# Patient Record
Sex: MALE | Race: WHITE | NOT HISPANIC OR LATINO | URBAN - METROPOLITAN AREA
[De-identification: names, ages, dates, MRNs, and addresses within clinical notes are randomized per-mention and may not be internally consistent; named-entity substitution may affect disease eponyms.]

---

## 2023-07-20 ENCOUNTER — TELEPHONE (OUTPATIENT)
Dept: ADMINISTRATIVE | Facility: OTHER | Age: 58
End: 2023-07-20

## 2023-07-20 ENCOUNTER — OFFICE VISIT (OUTPATIENT)
Dept: FAMILY MEDICINE CLINIC | Facility: CLINIC | Age: 58
End: 2023-07-20
Payer: COMMERCIAL

## 2023-07-20 VITALS
OXYGEN SATURATION: 98 % | WEIGHT: 222 LBS | HEART RATE: 66 BPM | TEMPERATURE: 96.5 F | BODY MASS INDEX: 33.65 KG/M2 | DIASTOLIC BLOOD PRESSURE: 88 MMHG | HEIGHT: 68 IN | RESPIRATION RATE: 16 BRPM | SYSTOLIC BLOOD PRESSURE: 130 MMHG

## 2023-07-20 DIAGNOSIS — Z12.5 SCREENING FOR PROSTATE CANCER: ICD-10-CM

## 2023-07-20 DIAGNOSIS — E78.5 HYPERLIPIDEMIA, UNSPECIFIED HYPERLIPIDEMIA TYPE: ICD-10-CM

## 2023-07-20 DIAGNOSIS — Z00.00 ENCOUNTER FOR WELL ADULT EXAM WITHOUT ABNORMAL FINDINGS: Primary | ICD-10-CM

## 2023-07-20 DIAGNOSIS — Z13.6 SCREENING FOR CARDIOVASCULAR CONDITION: ICD-10-CM

## 2023-07-20 PROBLEM — E78.49 OTHER HYPERLIPIDEMIA: Status: ACTIVE | Noted: 2022-05-23

## 2023-07-20 PROBLEM — E55.9 VITAMIN D DEFICIENCY: Status: ACTIVE | Noted: 2017-10-02

## 2023-07-20 PROBLEM — J30.89 NON-SEASONAL ALLERGIC RHINITIS: Status: ACTIVE | Noted: 2022-05-23

## 2023-07-20 PROBLEM — I10 HYPERTENSION: Status: ACTIVE | Noted: 2021-05-26

## 2023-07-20 PROBLEM — I35.1 MILD AORTIC REGURGITATION: Status: ACTIVE | Noted: 2019-04-15

## 2023-07-20 PROBLEM — R93.1 AGATSTON CAC SCORE 200-399: Status: ACTIVE | Noted: 2020-10-19

## 2023-07-20 PROBLEM — N20.0 KIDNEY STONE: Status: ACTIVE | Noted: 2022-05-23

## 2023-07-20 PROBLEM — I51.7 LEFT VENTRICULAR HYPERTROPHY: Status: ACTIVE | Noted: 2022-05-09

## 2023-07-20 PROBLEM — Z02.89 ENCOUNTER FOR EXAMINATION REQUIRED BY DEPARTMENT OF TRANSPORTATION (DOT): Status: ACTIVE | Noted: 2021-05-27

## 2023-07-20 PROCEDURE — 99386 PREV VISIT NEW AGE 40-64: CPT | Performed by: FAMILY MEDICINE

## 2023-07-20 RX ORDER — AMLODIPINE BESYLATE 5 MG/1
5 TABLET ORAL DAILY
COMMUNITY
Start: 2023-06-07 | End: 2023-07-24 | Stop reason: SDUPTHER

## 2023-07-20 RX ORDER — LORATADINE 10 MG/1
10 TABLET ORAL DAILY
COMMUNITY

## 2023-07-20 RX ORDER — RAMIPRIL 5 MG/1
5 CAPSULE ORAL DAILY
COMMUNITY
Start: 2023-06-25 | End: 2023-07-24 | Stop reason: SDUPTHER

## 2023-07-20 RX ORDER — ROSUVASTATIN CALCIUM 10 MG/1
10 TABLET, COATED ORAL
COMMUNITY
Start: 2023-04-27 | End: 2023-07-24 | Stop reason: SDUPTHER

## 2023-07-20 RX ORDER — OMEPRAZOLE 40 MG/1
40 CAPSULE, DELAYED RELEASE ORAL DAILY
COMMUNITY
Start: 2023-05-02 | End: 2023-07-24 | Stop reason: SDUPTHER

## 2023-07-20 NOTE — LETTER
Procedure Request Form: Colonoscopy      Date Requested: 23  Patient: Malia Crouch  Patient : 1965   Referring Provider: Yuan Mouse, DO        Date of Procedure ______________________________       The above patient has informed us that they have completed their   most recent Colonoscopy at your facility. Please complete   this form and attach all corresponding procedure reports/results. Comments __________________________________________________________  ____________________________________________________________________  ____________________________________________________________________  ____________________________________________________________________    Facility Completing Procedure _________________________________________    Form Completed By (print name) _______________________________________      Signature __________________________________________________________      These reports are needed for  compliance. Please fax this completed form and a copy of the procedure report to our office located at [address] [place] [address] as soon as possible to Fax [phone] attention Radha Baez: Phone [phone]    We thank you for your assistance in treating our mutual patient.     Dr. Mami Klein - [phone] F [phone]

## 2023-07-20 NOTE — LETTER
Procedure Request Form: Colonoscopy      Date Requested: 23  Patient: Cttyloaz Naval  Patient : 1965   Referring Provider: Marco A Magdipper, DO        Date of Procedure ______________________________       The above patient has informed us that they have completed their   most recent Colonoscopy at your facility. Please complete   this form and attach all corresponding procedure reports/results. Comments __________________________________________________________  ____________________________________________________________________  ____________________________________________________________________  ____________________________________________________________________    Facility Completing Procedure _________________________________________    Form Completed By (print name) _______________________________________      Signature __________________________________________________________      These reports are needed for  compliance. Please fax this completed form and a copy of the procedure report to our office located at [address] [place] [address] as soon as possible to Fax [phone] matilde Cho: Phone [phone]    We thank you for your assistance in treating our mutual patient.     Dr. Brittaney Hanley - [phone] F [phone]

## 2023-07-20 NOTE — TELEPHONE ENCOUNTER
----- Message from Suma Olmstead DO sent at 7/20/2023  8:52 AM EDT -----  Regarding: care gap request  07/20/23 8:52 AM    Hello, our patient attached above has had CRC: Colonoscopy completed/performed. Please assist in updating the patient chart by making an External outreach to [facility]  facility located in [facility] - dr Sarah Mitchell . The date of service is 8 years ago -10 year recall.     Thank you,  Amaury [facility]ALES [facility] Madhav WINTER

## 2023-07-20 NOTE — PROGRESS NOTES
Assessment/Plan:     Diagnoses and all orders for this visit:    Encounter for well adult exam without abnormal findings    Screening for cardiovascular condition  -     CBC and differential; Future  -     Comprehensive metabolic panel; Future  -     Lipid panel; Future    Screening for prostate cancer  -     UA (URINE) with reflex to Scope; Future  -     PSA, Total Screen; Future    Hyperlipidemia, unspecified hyperlipidemia type  -     Lipid panel; Future    Other orders  -     amLODIPine (NORVASC) 5 mg tablet; Take 5 mg by mouth daily  -     loratadine (CLARITIN) 10 mg tablet; Take 10 mg by mouth daily  -     omeprazole (PriLOSEC) 40 MG capsule; Take 40 mg by mouth daily  -     ramipril (ALTACE) 5 mg capsule; Take 5 mg by mouth daily  -     rosuvastatin (CRESTOR) 10 MG tablet; Take 10 mg by mouth daily at bedtime      Patient presented to establish  Meds are stable  Labs ordered  We will try to get his colonoscopy reports  His blood pressure is under control  He has no acute complaints today  Can follow-up as needed or in 1 year      Subjective:     Chief Complaint   Patient presents with   • Establish Care     New patient Physical        Patient ID: Carmen Carrillo is a 59 y.o. male. Patient presents today to establish  He has no acute complaints today we did review his past medical history in detail      The following portions of the patient's history were reviewed and updated as appropriate: allergies, current medications, past family history, past medical history, past social history, past surgical history and problem list.    Review of Systems   Constitutional: Negative. HENT: Negative. Eyes: Negative. Respiratory: Negative. Cardiovascular: Negative. Gastrointestinal: Negative. Endocrine: Negative. Genitourinary: Negative. Musculoskeletal: Negative. Skin: Negative. Allergic/Immunologic: Negative. Neurological: Negative. Hematological: Negative. Psychiatric/Behavioral: Negative. All other systems reviewed and are negative. Objective:    Vitals:    07/20/23 0835   BP: 130/88   BP Location: Left arm   Patient Position: Sitting   Cuff Size: Large   Pulse: 66   Resp: 16   Temp: (!) 96.5 °F (35.8 °C)   TempSrc: Tympanic   SpO2: 98%   Weight: 101 kg (222 lb)   Height: 5' 8.2" (1.732 m)          Physical Exam  Vitals and nursing note reviewed. Constitutional:       General: He is not in acute distress. Appearance: He is well-developed. HENT:      Head: Normocephalic. Right Ear: External ear normal.      Left Ear: External ear normal.      Nose: Nose normal.   Eyes:      General:         Right eye: No discharge. Left eye: No discharge. Conjunctiva/sclera: Conjunctivae normal.      Pupils: Pupils are equal, round, and reactive to light. Cardiovascular:      Rate and Rhythm: Normal rate and regular rhythm. Heart sounds: Normal heart sounds. Pulmonary:      Effort: Pulmonary effort is normal.      Breath sounds: Normal breath sounds. Abdominal:      General: Bowel sounds are normal. There is no distension. Palpations: Abdomen is soft. Tenderness: There is no abdominal tenderness. Musculoskeletal:         General: Normal range of motion. Cervical back: Normal range of motion. Skin:     General: Skin is warm and dry. Findings: No rash. Neurological:      Mental Status: He is alert and oriented to person, place, and time. Cranial Nerves: No cranial nerve deficit. Psychiatric:         Behavior: Behavior normal.         Thought Content: Thought content normal.         Judgment: Judgment normal.         BMI Counseling: Body mass index is 33.56 kg/m².  The BMI is above normal. Nutrition recommendations include reducing portion sizes, decreasing overall calorie intake, 3-5 servings of fruits/vegetables daily, reducing fast food intake, consuming healthier snacks, decreasing soda and/or juice intake, moderation in carbohydrate intake, increasing intake of lean protein, reducing intake of saturated fat and trans fat and reducing intake of cholesterol. Exercise recommendations include exercising 3-5 times per week.

## 2023-07-20 NOTE — LETTER
Procedure Request Form: Colonoscopy      Date Requested: 23  Patient: Marshia Bridgeport  Patient : 1965   Referring Provider: Silviano Ortega, DO        Date of Procedure ______________________________       The above patient has informed us that they have completed their   most recent Colonoscopy at your facility. Please complete   this form and attach all corresponding procedure reports/results. Comments __________________________________________________________  ____________________________________________________________________  ____________________________________________________________________  ____________________________________________________________________    Facility Completing Procedure _________________________________________    Form Completed By (print name) _______________________________________      Signature __________________________________________________________      These reports are needed for  compliance. Please fax this completed form and a copy of the procedure report to our office located at [address] [place] [address] as soon as possible to Fax [phone] matilde Trevino Bar: Phone [phone]    We thank you for your assistance in treating our mutual patient.     Dr. Shanel Verdugo - [phone] F [phone]

## 2023-07-24 DIAGNOSIS — E78.49 OTHER HYPERLIPIDEMIA: ICD-10-CM

## 2023-07-24 DIAGNOSIS — K21.9 GASTROESOPHAGEAL REFLUX DISEASE WITHOUT ESOPHAGITIS: ICD-10-CM

## 2023-07-24 DIAGNOSIS — I10 PRIMARY HYPERTENSION: Primary | ICD-10-CM

## 2023-07-24 RX ORDER — RAMIPRIL 5 MG/1
5 CAPSULE ORAL DAILY
Qty: 90 CAPSULE | Refills: 1 | Status: SHIPPED | OUTPATIENT
Start: 2023-07-24

## 2023-07-24 RX ORDER — OMEPRAZOLE 40 MG/1
40 CAPSULE, DELAYED RELEASE ORAL DAILY
Qty: 90 CAPSULE | Refills: 1 | Status: SHIPPED | OUTPATIENT
Start: 2023-07-24

## 2023-07-24 RX ORDER — ROSUVASTATIN CALCIUM 10 MG/1
10 TABLET, COATED ORAL
Qty: 90 TABLET | Refills: 1 | Status: SHIPPED | OUTPATIENT
Start: 2023-07-24

## 2023-07-24 RX ORDER — AMLODIPINE BESYLATE 5 MG/1
5 TABLET ORAL DAILY
Qty: 90 TABLET | Refills: 1 | Status: SHIPPED | OUTPATIENT
Start: 2023-07-24

## 2023-07-26 NOTE — TELEPHONE ENCOUNTER
Upon review of the In Basket request and the patient's chart, initial outreach has been made via fax to facility. Please see Contacts section for details.      Thank you  Js Lujan

## 2023-07-31 NOTE — TELEPHONE ENCOUNTER
As a follow-up, a second attempt has been made for outreach via fax to facility. Please see Contacts section for details.     Thank you  Parul Lyles

## 2023-08-08 NOTE — TELEPHONE ENCOUNTER
As a final attempt, a third outreach has been made via telephone call to facility. The outcome of the telephone call was a fax request form to be sent to Office/Facility. Please see Contacts section for details. This encounter will be closed and completed by end of day. Should we receive the requested information because of previous outreach attempts, the requested patient's chart will be updated appropriately.      Thank you  Bobby Bates

## 2023-08-29 LAB
ALBUMIN SERPL-MCNC: 4.6 G/DL (ref 3.8–4.9)
ALBUMIN/GLOB SERPL: 2.3 {RATIO} (ref 1.2–2.2)
ALP SERPL-CCNC: 104 IU/L (ref 44–121)
ALT SERPL-CCNC: 20 IU/L (ref 0–44)
APPEARANCE UR: CLEAR
AST SERPL-CCNC: 19 IU/L (ref 0–40)
BACTERIA URNS QL MICRO: NORMAL
BASOPHILS # BLD AUTO: 0 X10E3/UL (ref 0–0.2)
BASOPHILS NFR BLD AUTO: 1 %
BILIRUB SERPL-MCNC: 0.8 MG/DL (ref 0–1.2)
BILIRUB UR QL STRIP: NEGATIVE
BUN SERPL-MCNC: 16 MG/DL (ref 8–27)
BUN/CREAT SERPL: 18 (ref 10–24)
CALCIUM SERPL-MCNC: 9 MG/DL (ref 8.6–10.2)
CASTS URNS QL MICRO: NORMAL /LPF
CHLORIDE SERPL-SCNC: 105 MMOL/L (ref 96–106)
CHOLEST SERPL-MCNC: 138 MG/DL (ref 100–199)
CO2 SERPL-SCNC: 26 MMOL/L (ref 20–29)
COLOR UR: YELLOW
CREAT SERPL-MCNC: 0.89 MG/DL (ref 0.76–1.27)
EGFR: 98 ML/MIN/1.73
EOSINOPHIL # BLD AUTO: 0.2 X10E3/UL (ref 0–0.4)
EOSINOPHIL NFR BLD AUTO: 4 %
EPI CELLS #/AREA URNS HPF: NORMAL /HPF (ref 0–10)
ERYTHROCYTE [DISTWIDTH] IN BLOOD BY AUTOMATED COUNT: 11.8 % (ref 11.6–15.4)
GLOBULIN SER-MCNC: 2 G/DL (ref 1.5–4.5)
GLUCOSE SERPL-MCNC: 95 MG/DL (ref 70–99)
GLUCOSE UR QL: NEGATIVE
HCT VFR BLD AUTO: 44.7 % (ref 37.5–51)
HDLC SERPL-MCNC: 62 MG/DL
HGB BLD-MCNC: 15.4 G/DL (ref 13–17.7)
HGB UR QL STRIP: NEGATIVE
IMM GRANULOCYTES # BLD: 0 X10E3/UL (ref 0–0.1)
IMM GRANULOCYTES NFR BLD: 0 %
KETONES UR QL STRIP: NEGATIVE
LDLC SERPL CALC-MCNC: 64 MG/DL (ref 0–99)
LEUKOCYTE ESTERASE UR QL STRIP: ABNORMAL
LYMPHOCYTES # BLD AUTO: 2 X10E3/UL (ref 0.7–3.1)
LYMPHOCYTES NFR BLD AUTO: 30 %
MCH RBC QN AUTO: 31.4 PG (ref 26.6–33)
MCHC RBC AUTO-ENTMCNC: 34.5 G/DL (ref 31.5–35.7)
MCV RBC AUTO: 91 FL (ref 79–97)
MICRO URNS: ABNORMAL
MONOCYTES # BLD AUTO: 0.9 X10E3/UL (ref 0.1–0.9)
MONOCYTES NFR BLD AUTO: 13 %
NEUTROPHILS # BLD AUTO: 3.7 X10E3/UL (ref 1.4–7)
NEUTROPHILS NFR BLD AUTO: 52 %
NITRITE UR QL STRIP: NEGATIVE
PH UR STRIP: 7 [PH] (ref 5–7.5)
PLATELET # BLD AUTO: 209 X10E3/UL (ref 150–450)
POTASSIUM SERPL-SCNC: 4.3 MMOL/L (ref 3.5–5.2)
PROT SERPL-MCNC: 6.6 G/DL (ref 6–8.5)
PROT UR QL STRIP: ABNORMAL
PSA SERPL-MCNC: 0.4 NG/ML (ref 0–4)
RBC # BLD AUTO: 4.91 X10E6/UL (ref 4.14–5.8)
RBC #/AREA URNS HPF: NORMAL /HPF (ref 0–2)
SL AMB VLDL CHOLESTEROL CALC: 12 MG/DL (ref 5–40)
SODIUM SERPL-SCNC: 143 MMOL/L (ref 134–144)
SP GR UR: 1.02 (ref 1–1.03)
TRIGL SERPL-MCNC: 53 MG/DL (ref 0–149)
UROBILINOGEN UR STRIP-ACNC: 0.2 MG/DL (ref 0.2–1)
WBC # BLD AUTO: 6.9 X10E3/UL (ref 3.4–10.8)
WBC #/AREA URNS HPF: NORMAL /HPF (ref 0–5)

## 2023-10-06 ENCOUNTER — APPOINTMENT (OUTPATIENT)
Dept: RADIOLOGY | Facility: CLINIC | Age: 58
End: 2023-10-06
Payer: COMMERCIAL

## 2023-10-06 ENCOUNTER — OFFICE VISIT (OUTPATIENT)
Dept: URGENT CARE | Facility: CLINIC | Age: 58
End: 2023-10-06
Payer: COMMERCIAL

## 2023-10-06 VITALS
BODY MASS INDEX: 33.65 KG/M2 | TEMPERATURE: 98.8 F | HEART RATE: 65 BPM | OXYGEN SATURATION: 98 % | SYSTOLIC BLOOD PRESSURE: 120 MMHG | WEIGHT: 222 LBS | DIASTOLIC BLOOD PRESSURE: 88 MMHG | RESPIRATION RATE: 16 BRPM | HEIGHT: 68 IN

## 2023-10-06 DIAGNOSIS — M54.6 ACUTE MIDLINE THORACIC BACK PAIN: Primary | ICD-10-CM

## 2023-10-06 DIAGNOSIS — S39.92XA INJURY OF BACK, INITIAL ENCOUNTER: ICD-10-CM

## 2023-10-06 PROCEDURE — 72072 X-RAY EXAM THORAC SPINE 3VWS: CPT

## 2023-10-06 PROCEDURE — G0382 LEV 3 HOSP TYPE B ED VISIT: HCPCS | Performed by: NURSE PRACTITIONER

## 2023-10-06 NOTE — PROGRESS NOTES
[facility]        NAME: Araceli Hirsch is a 59 y.o. male  : 1965    MRN: [id number]  DATE: 2023  TIME: 11:18 AM    Assessment and Plan   Acute midline thoracic back pain [M54.6]  1. Acute midline thoracic back pain  Ambulatory Referral to Physical Therapy    Ambulatory Referral to Orthopedic Surgery      2. Injury of back, initial encounter  XR spine thoracic 3 vw    Ambulatory Referral to Physical Therapy    Ambulatory Referral to Orthopedic Surgery        Symptomatic x3 to 4 months with constant pain worsening intermittently. States currently seeing chiropractor without relief. Wet read x-rays showed T10 chronic minimal loss of stature and all else unremarkable with no acute osseous abnormalities-radiologist read confirms. Patient not wanting to take medications but is willing for physical therapy and orthopedic referral.  Referrals placed follow-up with any worsening of symptoms no improvement    Patient Instructions       Follow up with PCP in 3-5 days. Proceed to  ER if symptoms worsen. Chief Complaint     Chief Complaint   Patient presents with   • Back Injury     Pt reports midline thoracic/lumbar back pain that resulted from injury that occurred four months while loading concrete. States symptoms have since been intermittent. Denies managing with otc medication. Has been f/u with chiropractor. History of Present Illness       Patient is a 13-year-old male arrives with complaints of thoracic back pain occurring for approximately 3 to 4 months. Patient reports the pain is midline to the thoracic back sometimes it radiates laterally to the bilateral sides. He denies any numbness tingling changes to bowel bladder habits and saddle anesthesia. He has not completed physical therapy has not taken any medications. He does report that the initial pain occurred approximately 3 to 4 months ago when he was lifting cement bags into his vehicle.   Since then he has been having constant 2-3 out of 10 on the pain scale and then worsening intermittently. He reports occasionally he feels a popping. He has been seeing the chiropractor who has been adjusting his back but has not had relief in this timeframe. Review of Systems   Review of Systems   Constitutional:  Negative for activity change, appetite change, chills, fatigue and fever. HENT:  Negative for congestion, rhinorrhea, sinus pressure, sinus pain and sore throat. Respiratory:  Negative for cough, chest tightness and shortness of breath. Gastrointestinal:  Negative for constipation, diarrhea, nausea and vomiting. Musculoskeletal:  Positive for arthralgias, back pain and myalgias. Negative for gait problem and joint swelling. Skin:  Negative for color change, pallor and rash. Neurological:  Negative for dizziness, syncope, weakness, light-headedness and headaches. Hematological:  Negative for adenopathy. Psychiatric/Behavioral:  Negative for agitation and confusion.           Current Medications       Current Outpatient Medications:   •  amLODIPine (NORVASC) 5 mg tablet, Take 1 tablet (5 mg total) by mouth daily, Disp: 90 tablet, Rfl: 1  •  omeprazole (PriLOSEC) 40 MG capsule, Take 1 capsule (40 mg total) by mouth daily, Disp: 90 capsule, Rfl: 1  •  ramipril (ALTACE) 5 mg capsule, Take 1 capsule (5 mg total) by mouth daily, Disp: 90 capsule, Rfl: 1  •  rosuvastatin (CRESTOR) 10 MG tablet, Take 1 tablet (10 mg total) by mouth daily at bedtime, Disp: 90 tablet, Rfl: 1  •  loratadine (CLARITIN) 10 mg tablet, Take 10 mg by mouth daily (Patient not taking: Reported on 10/6/2023), Disp: , Rfl:     Current Allergies     Allergies as of 10/06/2023   • (No Known Allergies)            The following portions of the patient's history were reviewed and updated as appropriate: allergies, current medications, past family history, past medical history, past social history, past surgical history and problem list. History reviewed. No pertinent past medical history. History reviewed. No pertinent surgical history. History reviewed. No pertinent family history. Medications have been verified. Objective   /88 (BP Location: Left arm, Patient Position: Sitting)   Pulse 65   Temp 98.8 °F (37.1 °C)   Resp 16   Ht 5' 8" (1.727 m)   Wt 101 kg (222 lb)   SpO2 98%   BMI 33.75 kg/m²   No LMP for male patient. Physical Exam     Physical Exam  Vitals and nursing note reviewed. Constitutional:       General: He is not in acute distress. Appearance: Normal appearance. He is not ill-appearing, toxic-appearing or diaphoretic. HENT:      Head: Normocephalic and atraumatic. Nose: No congestion or rhinorrhea. Mouth/Throat:      Mouth: Mucous membranes are moist.   Eyes:      General: No scleral icterus. Right eye: No discharge. Left eye: No discharge. Conjunctiva/sclera: Conjunctivae normal.   Pulmonary:      Effort: Pulmonary effort is normal. No respiratory distress. Musculoskeletal:         General: Tenderness and signs of injury present. No swelling. Cervical back: Normal range of motion. Thoracic back: Spasms and tenderness present. No swelling or signs of trauma. Decreased range of motion. Skin:     General: Skin is dry. Neurological:      Mental Status: He is alert and oriented to person, place, and time. Psychiatric:         Mood and Affect: Mood normal.         Behavior: Behavior normal.         Thought Content:  Thought content normal.         Judgment: Judgment normal.

## 2023-10-17 ENCOUNTER — CONSULT (OUTPATIENT)
Dept: PAIN MEDICINE | Facility: CLINIC | Age: 58
End: 2023-10-17
Payer: COMMERCIAL

## 2023-10-17 VITALS
BODY MASS INDEX: 32.89 KG/M2 | SYSTOLIC BLOOD PRESSURE: 132 MMHG | HEIGHT: 68 IN | TEMPERATURE: 98.5 F | DIASTOLIC BLOOD PRESSURE: 84 MMHG | HEART RATE: 74 BPM | WEIGHT: 217 LBS

## 2023-10-17 DIAGNOSIS — S22.000D COMPRESSION FRACTURE OF THORACIC VERTEBRA WITH ROUTINE HEALING, UNSPECIFIED THORACIC VERTEBRAL LEVEL, SUBSEQUENT ENCOUNTER: Primary | ICD-10-CM

## 2023-10-17 DIAGNOSIS — M54.6 ACUTE MIDLINE THORACIC BACK PAIN: ICD-10-CM

## 2023-10-17 DIAGNOSIS — S39.92XA INJURY OF BACK, INITIAL ENCOUNTER: ICD-10-CM

## 2023-10-17 PROCEDURE — 99204 OFFICE O/P NEW MOD 45 MIN: CPT | Performed by: ANESTHESIOLOGY

## 2023-10-17 RX ORDER — PREDNISONE 10 MG/1
TABLET ORAL
Qty: 36 TABLET | Refills: 0 | Status: SHIPPED | OUTPATIENT
Start: 2023-10-17

## 2023-10-17 NOTE — PROGRESS NOTES
Assessment  1. Compression fracture of thoracic vertebra with routine healing, unspecified thoracic vertebral level, subsequent encounter    2. Injury of back, initial encounter    3. Acute midline thoracic back pain        Plan      At this point the patient's pain persists despite time, relative rest, activity modification, and nonsteroidal anti-inflammatories. Is undergone chiropractic treatment and reviewing the imaging of the x-ray it does reveal potential of compression fracture at T10. His pain is significantly interfering with his daily living activities. It is appropriate to order an MRI of the thoracic spine to rule out any significant etiology of his symptoms. I will start him on a titrating dose of oral prednisone to address any inflammatory component of the patient's pain. He understands he should not take nonsteroidal anti-inflammatories until he is finished with this steroid. He understands there is a chance of decreased immunity secondary to steroids. If he has any problems or questions he will give us a call. Once we obtain MRI results, I will follow-up with the patient, review the results and current symptoms, and discuss the next steps of the treatment plan. My impressions and treatment recommendations were discussed in detail with the patient who verbalized understanding and had no further questions. Discharge instructions were provided. I personally saw and examined the patient and I agree with the above discussed plan of care. This note is created using dictation transcription. It may contain typographical errors, grammatical errors, improperly dictated words, background noise and other errors. Orders Placed This Encounter   Procedures    MRI thoracic spine without contrast     Standing Status:   Future     Standing Expiration Date:   10/17/2027     Scheduling Instructions:       There is no preparation for this test. Please leave your jewelry and valuables at home, wedding rings are the exception. All patients will be required to change into a hospital gown and pants. Street clothes are not permitted in the MRI. Magnetic nail polish must be removed prior to arrival for your test. Please bring your insurance cards, a form of photo ID and a list of your medications with you. Arrive 15 minutes prior to your appointment time in order to register. Please bring any prior CT or MRI studies of this area that were not performed at a [facility] facility. To schedule this appointment, please contact Central Scheduling at [phone]. Prior to your appointment, please make sure you complete the MRI Screening Form when you e-Check in for your appointment. This will be available starting 7 days before your appointment in [address] [place] [address]. You may receive an e-mail with an activation code if you do not have a [employer] account. If you do not have access to a device, we will complete your screening at your appointment. Order Specific Question:   What is the patient's sedation requirement? If Medication for Claustrophobia is selected, order medication at this point. Answer:   No Sedation     Order Specific Question:   Does this procedure require the 3T MRI at [facility] or [place]?     Answer:   No     Order Specific Question:   Release to patient through [facility]     Answer:   Immediate     Order Specific Question:   Is order priority selected as STAT? Answer:   No     Order Specific Question:   Reason for Exam (FREE TEXT)     Answer:   compresion fracture     New Medications Ordered This Visit   Medications    predniSONE 10 mg tablet     Sig: Take according to titration schedule     Dispense:  36 tablet     Refill:  0     Referred By: GLENNA Gutierrez  History of Present Illness    Elyssa Tavarez is a 59 y.o. male 1 month history of mid back pain which is radiating. This occurred after lifting heavy cement into his flatbed.   Since then he has undergone chiropractic treatment starting physical therapy but his pain persists which is moderate to severe. Is constant described as sharp and achy. He denies any weakness or bowel bladder dysfunction. Lying down and sitting decreases symptoms while bending and walking aggravates his pain. I have personally reviewed and/or updated the patient's past medical history, past surgical history, family history, social history, current medications, allergies, and vital signs today. Review of Systems   Constitutional:  Negative for fever and unexpected weight change. HENT:  Negative for trouble swallowing. Eyes:  Negative for visual disturbance. Respiratory:  Negative for shortness of breath and wheezing. Cardiovascular:  Negative for chest pain and palpitations. Gastrointestinal:  Negative for constipation, diarrhea, nausea and vomiting. Endocrine: Negative for cold intolerance, heat intolerance and polydipsia. Genitourinary:  Negative for difficulty urinating and frequency. Musculoskeletal:  Negative for arthralgias, gait problem, joint swelling and myalgias. Skin:  Negative for rash. Neurological:  Negative for dizziness, seizures, syncope, weakness and headaches. Hematological:  Does not bruise/bleed easily. Psychiatric/Behavioral:  Negative for dysphoric mood. All other systems reviewed and are negative.       Patient Active Problem List   Diagnosis    Abdominal pain    Agatston CAC score 200-399    Benign essential hypertension    Benign prostatic hyperplasia with urinary obstruction    Celiac disease    Depressive disorder, not elsewhere classified    Encounter for examination required by Department of Transportation (DOT)    Hydronephrosis    Hypertension    Impotence of organic origin    Kidney stone    Left inguinal hernia    Left ventricular hypertrophy    Microscopic hematuria    Mild aortic regurgitation    Narcolepsy    Non-seasonal allergic rhinitis    Other hyperlipidemia    Sleep apnea Vitamin D deficiency       Past Medical History:   Diagnosis Date    Hypertension        Past Surgical History:   Procedure Laterality Date    HERNIA REPAIR      TONSILLECTOMY         History reviewed. No pertinent family history. Social History     Occupational History    Not on file   Tobacco Use    Smoking status: Never     Passive exposure: Past    Smokeless tobacco: Never   Vaping Use    Vaping Use: Never used   Substance and Sexual Activity    Alcohol use: Yes     Alcohol/week: 7.0 - 8.0 standard drinks of alcohol     Types: 7 - 8 Standard drinks or equivalent per week    Drug use: Not Currently    Sexual activity: Not on file       Current Outpatient Medications on File Prior to Visit   Medication Sig    amLODIPine (NORVASC) 5 mg tablet Take 1 tablet (5 mg total) by mouth daily    omeprazole (PriLOSEC) 40 MG capsule Take 1 capsule (40 mg total) by mouth daily    ramipril (ALTACE) 5 mg capsule Take 1 capsule (5 mg total) by mouth daily    rosuvastatin (CRESTOR) 10 MG tablet Take 1 tablet (10 mg total) by mouth daily at bedtime    loratadine (CLARITIN) 10 mg tablet Take 10 mg by mouth daily (Patient not taking: Reported on 10/6/2023)     No current facility-administered medications on file prior to visit. No Known Allergies    Physical Exam    /84 (BP Location: Left arm, Patient Position: Sitting, Cuff Size: Standard)   Pulse 74   Temp 98.5 °F (36.9 °C)   Ht 5' 8" (1.727 m)   Wt 98.4 kg (217 lb)   BMI 32.99 kg/m²     Constitutional: normal, well developed, well nourished, alert, in no distress and non-toxic and no overt pain behavior.  and overweight  Eyes: anicteric  HEENT: grossly intact  Neck: supple, symmetric, trachea midline and no masses   Pulmonary:even and unlabored  Cardiovascular:No edema or pitting edema present  Skin:Normal without rashes or lesions and well hydrated  Psychiatric:Mood and affect appropriate  Neurologic:Cranial Nerves II-XII grossly intact  Musculoskeletal:normal    Thoracic Spine Exam    Appearance:  Normal lordosis  Palpation/Tenderness:  no tenderness or spasm  Sensory:  no sensory deficits noted  Range of Motion:  Full range of motion with no pain or limitations in flexion, extension, lateral flexion and rotation  Motor Strength:  No motor deficits are appreciated  Reflexes:  Symmetrical upper and lower limbs       Imaging  THORACIC SPINE @  10-6-23     INDICATION:   S39. 92XA: Unspecified injury of lower back, initial encounter. COMPARISON:  None     VIEWS:  XR SPINE THORACIC 3 VW        FINDINGS:     There is no fracture or pathologic bone lesion. T10 chronic minimal loss of stature. Thoracic vertebral alignment is within normal limits. No significant degenerative changes. There is no displacement of the paraspinal line. The pedicles appear intact. IMPRESSION:     No acute osseous abnormality. I have personally reviewed pertinent films in PACS and my interpretation is potential mild compression fracture at T10 with no significant degenerative changes.

## 2023-11-01 ENCOUNTER — HOSPITAL ENCOUNTER (OUTPATIENT)
Dept: MRI IMAGING | Facility: HOSPITAL | Age: 58
Discharge: HOME | End: 2023-11-01
Attending: ANESTHESIOLOGY
Payer: COMMERCIAL

## 2023-11-01 ENCOUNTER — HOSPITAL ENCOUNTER (OUTPATIENT)
Dept: RADIOLOGY | Facility: HOSPITAL | Age: 58
Discharge: HOME | End: 2023-11-01
Payer: COMMERCIAL

## 2023-11-01 DIAGNOSIS — M54.6 ACUTE MIDLINE THORACIC BACK PAIN: ICD-10-CM

## 2023-11-01 DIAGNOSIS — S22.000D COMPRESSION FRACTURE OF THORACIC VERTEBRA WITH ROUTINE HEALING, UNSPECIFIED THORACIC VERTEBRAL LEVEL, SUBSEQUENT ENCOUNTER: ICD-10-CM

## 2023-11-01 PROCEDURE — G1004 CDSM NDSC: HCPCS

## 2023-11-01 PROCEDURE — 72146 MRI CHEST SPINE W/O DYE: CPT

## 2023-11-05 DIAGNOSIS — S22.070A CLOSED WEDGE COMPRESSION FRACTURE OF T10 VERTEBRA, INITIAL ENCOUNTER (HCC): Primary | ICD-10-CM

## 2023-11-06 ENCOUNTER — TELEPHONE (OUTPATIENT)
Dept: PAIN MEDICINE | Facility: CLINIC | Age: 58
End: 2023-11-06

## 2023-11-06 NOTE — TELEPHONE ENCOUNTER
----- Message from Selina Romeo DO sent at 11/5/2023 10:04 AM EST -----  Age indeterminate compression fx of thoracic vertebrae.  Keep f/u with our office, recommend eval by Dr Jodee Rodriguez veterbroplasty if still in pain, referral placed

## 2023-11-09 ENCOUNTER — TELEPHONE (OUTPATIENT)
Dept: NEUROSURGERY | Facility: CLINIC | Age: 58
End: 2023-11-09

## 2023-12-28 PROBLEM — S22.079A CLOSED T10 FRACTURE (HCC): Status: ACTIVE | Noted: 2023-12-28

## 2023-12-28 NOTE — ASSESSMENT & PLAN NOTE
New evaluation of a T10 fracture  Back pain since May-June 2023 while twisting lifting bags of concrete.   Pain has improved, but is lingering. Denies radiation to his chest, abdomen.  Exam: No midline spinal TTP, BLE 5/5, LT intact, 2+ DTRs    Imaging:  MRI thoracic 11/1/23: Age indeterminant Schmorl's node deformity superior endplate of T10 associated with T1 and T2 signal prolongation so could be acute or subacute in nature. Old mild anterior wedging compression fracture of T10 loss of 15% anterior vertebral height. No other disc herniation or significant narrowing of the spinal canal or neural foramina evident. No neural encroachment evident.  XR thoracic 10/6/23:  No acute osseous abnormality.     Plan:  Reviewed imaging with patient and Dr. Murphy.  The T10 fracture is chronic on MRI imaging with an age-indeterminate Schmorl's node.  No recommendation for kyphoplasty at this time in the absence of an acute fracture.  Recommend DEXA scan to evaluate for osteoporosis given fracture.  Order placed.  He is aware to follow-up with his PCP for management.  Recommend following up with Dr. Martinez for consideration of possible injection.  As his pain continues to improve, he may start to resume his normal activities.  Reviewed red flag signs and symptoms.  Follow-up as needed.  Call with any questions or concerns.

## 2023-12-29 ENCOUNTER — OFFICE VISIT (OUTPATIENT)
Dept: NEUROSURGERY | Facility: CLINIC | Age: 58
End: 2023-12-29
Payer: COMMERCIAL

## 2023-12-29 VITALS
TEMPERATURE: 98.5 F | SYSTOLIC BLOOD PRESSURE: 138 MMHG | RESPIRATION RATE: 16 BRPM | OXYGEN SATURATION: 99 % | HEART RATE: 51 BPM | HEIGHT: 68 IN | WEIGHT: 213 LBS | DIASTOLIC BLOOD PRESSURE: 90 MMHG | BODY MASS INDEX: 32.28 KG/M2

## 2023-12-29 DIAGNOSIS — S22.070A CLOSED WEDGE COMPRESSION FRACTURE OF T10 VERTEBRA, INITIAL ENCOUNTER (HCC): ICD-10-CM

## 2023-12-29 PROCEDURE — 99203 OFFICE O/P NEW LOW 30 MIN: CPT | Performed by: PHYSICIAN ASSISTANT

## 2023-12-29 NOTE — PROGRESS NOTES
Neurosurgery Office Note  Jake Hines 58 y.o. male MRN: [id number]      Assessment/Plan     Closed T10 fracture (HCC)  New evaluation of a T10 fracture  Back pain since May-June 2023 while twisting lifting bags of concrete.   Pain has improved, but is lingering. Denies radiation to his chest, abdomen.  Exam: No midline spinal TTP, BLE 5/5, LT intact, 2+ DTRs    Imaging:  MRI thoracic 11/1/23: Age indeterminant Schmorl's node deformity superior endplate of T10 associated with T1 and T2 signal prolongation so could be acute or subacute in nature. Old mild anterior wedging compression fracture of T10 loss of 15% anterior vertebral height. No other disc herniation or significant narrowing of the spinal canal or neural foramina evident. No neural encroachment evident.  XR thoracic 10/6/23:  No acute osseous abnormality.     Plan:  Reviewed imaging with patient and Dr. Murphy.  The T10 fracture is chronic on MRI imaging with an age-indeterminate Schmorl's node.  No recommendation for kyphoplasty at this time in the absence of an acute fracture.  Recommend DEXA scan to evaluate for osteoporosis given fracture.  Order placed.  He is aware to follow-up with his PCP for management.  Recommend following up with Dr. Martinez for consideration of possible injection.  As his pain continues to improve, he may start to resume his normal activities.  Reviewed red flag signs and symptoms.  Follow-up as needed.  Call with any questions or concerns.       Diagnoses and all orders for this visit:    Closed wedge compression fracture of T10 vertebra, initial encounter (HCC)  -     Ambulatory referral to Neurosurgery  -     DXA bone density spine hip and pelvis; Future    Other orders  -     Ibuprofen (ADVIL PO); Take by mouth as needed          I have spent a total time of 40 minutes on 12/29/23 in caring for this patient including Diagnostic results, Risks and benefits of tx options, Instructions for management, Patient and family  education, Impressions, Counseling / Coordination of care, Documenting in the medical record, Reviewing / ordering tests, medicine, procedures  , Obtaining or reviewing history  , and Communicating with other healthcare professionals .      CHIEF COMPLAINT    Chief Complaint   Patient presents with    Consult     With MRI       HISTORY    History of Present Illness     58 y.o. year old male     58 year old gentleman seen for new evaluation of back pain.  States that he was lifting concrete bags in May - June 2023 and while twisting he felt a pop in his back.  Following this he had 8/10 pain.  Tried going to the chiropractor, massage, heat and ice without significant relief. One day he was twisting to get his alarm clock and felt another pop.  He was participating in PT, but this made his pain worse.  He takes Advil, sometimes naproxen for pain, though nothing currently.  Some days his pain is better than others.  Right now, his pain is about 1-2/10, at its worst 5/10.  Denies radiation to his chest or abdomen.  He still has some difficulty with changing positions such as rolling out of bed.  Laying down is fine as is sitting.  Sometimes standing, bending, lifting causes some pain.  No bowel or bladder incontinence.  He ambulates independently.         See Discussion    REVIEW OF SYSTEMS    Review of Systems   Respiratory:  Negative for chest tightness and shortness of breath.    Gastrointestinal: Negative.    Genitourinary: Negative.    Musculoskeletal:  Positive for back pain (center mid back radiates around to ribs B/L). Negative for gait problem.   Skin: Negative.    Neurological:  Negative for seizures, weakness and numbness.   Psychiatric/Behavioral:  Negative for sleep disturbance.        ROS obtained by MA. Reviewed. See HPI.     Meds/Allergies     Current Outpatient Medications   Medication Sig Dispense Refill    amLODIPine (NORVASC) 5 mg tablet Take 1 tablet (5 mg total) by mouth daily 90 tablet 1     "Ibuprofen (ADVIL PO) Take by mouth as needed      loratadine (CLARITIN) 10 mg tablet Take 10 mg by mouth as needed      omeprazole (PriLOSEC) 40 MG capsule Take 1 capsule (40 mg total) by mouth daily 90 capsule 1    ramipril (ALTACE) 5 mg capsule Take 1 capsule (5 mg total) by mouth daily 90 capsule 1    rosuvastatin (CRESTOR) 10 MG tablet Take 1 tablet (10 mg total) by mouth daily at bedtime 90 tablet 1    predniSONE 10 mg tablet Take according to titration schedule (Patient not taking: Reported on 12/29/2023) 36 tablet 0     No current facility-administered medications for this visit.       No Known Allergies    PAST HISTORY    Past Medical History:   Diagnosis Date    Closed T10 fracture (HCC) 12/28/2023    Hypertension        Past Surgical History:   Procedure Laterality Date    HERNIA REPAIR      TONSILLECTOMY         Social History     Tobacco Use    Smoking status: Never     Passive exposure: Past    Smokeless tobacco: Never   Vaping Use    Vaping status: Never Used   Substance Use Topics    Alcohol use: Yes     Alcohol/week: 7.0 - 8.0 standard drinks of alcohol     Types: 7 - 8 Standard drinks or equivalent per week    Drug use: Not Currently       History reviewed. No pertinent family history.      Above history personally reviewed.       EXAM    Vitals:Blood pressure 138/90, pulse (!) 51, temperature 98.5 °F (36.9 °C), temperature source Temporal, resp. rate 16, height 5' 8\" (1.727 m), weight 96.6 kg (213 lb), SpO2 99%.,Body mass index is 32.39 kg/m².     Physical Exam  Vitals reviewed.   Constitutional:       General: He is awake.      Appearance: Normal appearance.   HENT:      Head: Normocephalic and atraumatic.   Eyes:      Conjunctiva/sclera: Conjunctivae normal.   Cardiovascular:      Rate and Rhythm: Normal rate.   Pulmonary:      Effort: Pulmonary effort is normal.   Musculoskeletal:      Comments: No midline spinal TTP   Skin:     General: Skin is warm and dry.   Neurological:      Mental Status: " He is alert and oriented to person, place, and time.      Gait: Gait is intact.      Deep Tendon Reflexes:      Reflex Scores:       Patellar reflexes are 2+ on the right side and 2+ on the left side.  Psychiatric:         Attention and Perception: Attention and perception normal.         Mood and Affect: Mood and affect normal.         Speech: Speech normal.         Behavior: Behavior normal. Behavior is cooperative.         Thought Content: Thought content normal.         Cognition and Memory: Cognition and memory normal.         Judgment: Judgment normal.         Neurologic Exam     Mental Status   Oriented to person, place, and time.   Follows 2 step commands.   Attention: normal. Concentration: normal.   Speech: speech is normal   Level of consciousness: alert  Knowledge: good.   Normal comprehension.     Motor Exam   Muscle bulk: normal  Overall muscle tone: normal  BLE - 5/5     Sensory Exam   Right leg light touch: normal  Left leg light touch: normal    Gait, Coordination, and Reflexes     Gait  Gait: normal    Tremor   Resting tremor: absent  Intention tremor: absent  Action tremor: absent    Reflexes   Right patellar: 2+  Left patellar: 2+        MEDICAL DECISION MAKING    Imaging Studies:     No results found.    I have personally reviewed pertinent reports.   and I have personally reviewed pertinent films in PACS

## 2024-01-16 ENCOUNTER — HOSPITAL ENCOUNTER (OUTPATIENT)
Dept: BONE DENSITY | Facility: IMAGING CENTER | Age: 59
Discharge: HOME | End: 2024-01-16

## 2024-01-16 VITALS — WEIGHT: 209 LBS | HEIGHT: 68 IN | BODY MASS INDEX: 31.67 KG/M2

## 2024-01-16 DIAGNOSIS — S22.070A CLOSED WEDGE COMPRESSION FRACTURE OF T10 VERTEBRA, INITIAL ENCOUNTER (HCC): ICD-10-CM

## 2024-01-16 PROCEDURE — 77080 DXA BONE DENSITY AXIAL: CPT

## 2024-01-19 DIAGNOSIS — E78.49 OTHER HYPERLIPIDEMIA: ICD-10-CM

## 2024-01-19 DIAGNOSIS — I10 PRIMARY HYPERTENSION: ICD-10-CM

## 2024-01-19 RX ORDER — RAMIPRIL 5 MG/1
5 CAPSULE ORAL DAILY
Qty: 90 CAPSULE | Refills: 1 | Status: SHIPPED | OUTPATIENT
Start: 2024-01-19

## 2024-01-19 RX ORDER — ROSUVASTATIN CALCIUM 10 MG/1
10 TABLET, COATED ORAL
Qty: 90 TABLET | Refills: 1 | Status: SHIPPED | OUTPATIENT
Start: 2024-01-19

## 2024-01-31 DIAGNOSIS — K21.9 GASTROESOPHAGEAL REFLUX DISEASE WITHOUT ESOPHAGITIS: ICD-10-CM

## 2024-01-31 RX ORDER — OMEPRAZOLE 40 MG/1
40 CAPSULE, DELAYED RELEASE ORAL DAILY
Qty: 90 CAPSULE | Refills: 3 | Status: SHIPPED | OUTPATIENT
Start: 2024-01-31

## 2024-02-21 DIAGNOSIS — I10 PRIMARY HYPERTENSION: ICD-10-CM

## 2024-02-21 RX ORDER — AMLODIPINE BESYLATE 5 MG/1
5 TABLET ORAL DAILY
Qty: 90 TABLET | Refills: 1 | Status: SHIPPED | OUTPATIENT
Start: 2024-02-21

## 2024-07-03 ENCOUNTER — OFFICE VISIT (OUTPATIENT)
Age: 59
End: 2024-07-03
Payer: COMMERCIAL

## 2024-07-03 VITALS
SYSTOLIC BLOOD PRESSURE: 150 MMHG | HEIGHT: 68 IN | BODY MASS INDEX: 32.86 KG/M2 | WEIGHT: 216.8 LBS | DIASTOLIC BLOOD PRESSURE: 90 MMHG

## 2024-07-03 DIAGNOSIS — Z12.11 SCREENING FOR COLORECTAL CANCER: ICD-10-CM

## 2024-07-03 DIAGNOSIS — K90.0 CELIAC DISEASE: Primary | ICD-10-CM

## 2024-07-03 DIAGNOSIS — K22.70 BARRETT'S ESOPHAGUS WITHOUT DYSPLASIA: ICD-10-CM

## 2024-07-03 DIAGNOSIS — Z12.12 SCREENING FOR COLORECTAL CANCER: ICD-10-CM

## 2024-07-03 DIAGNOSIS — K21.9 GASTROESOPHAGEAL REFLUX DISEASE, UNSPECIFIED WHETHER ESOPHAGITIS PRESENT: ICD-10-CM

## 2024-07-03 DIAGNOSIS — R19.7 DIARRHEA, UNSPECIFIED TYPE: ICD-10-CM

## 2024-07-03 PROCEDURE — 99204 OFFICE O/P NEW MOD 45 MIN: CPT | Performed by: INTERNAL MEDICINE

## 2024-07-03 NOTE — PROGRESS NOTES
[facility] - Outpatient Consultation  Jake Hines 59 y.o. male MRN: [id number]  Encounter: [id number]    ASSESSMENT AND PLAN:    1. Celiac disease  -     CBC and differential; Future  -     Comprehensive metabolic panel; Future  -     Giardia antigen; Future  -     Calprotectin,Fecal; Future  -     Pancreatic elastase, fecal; Future  -     Stool Enteric Bacterial Panel by PCR; Future  -     TSH, 3rd generation; Future  -     C-reactive protein; Future  2. Patricio's esophagus without dysplasia  3. Gastroesophageal reflux disease, unspecified whether esophagitis present  4. Screening for colorectal cancer  5. Diarrhea, unspecified type    Assessment & Plan  1. Celiac disease.  The patient's symptoms are indicative of celiac disease, as evidenced by an increased frequency of bowel movements, occurring more than three times daily. A comprehensive discussion regarding various treatment options was conducted. The patient was advised to eliminate gluten from his diet, which could potentially alleviate the frequency of his bowel movements. A blood and stool test will be conducted to rule out any underlying issues.     2.  Patricio's esophagus without dysplasia.  GERD.  Last had a endoscopy in 2023.  Recommended repeat in 2026.  Can continue omeprazole 40 mg at this time.    3.  Screening for colorectal cancer.  Last had a colonoscopy about 5 years ago.  Recommended for repeat after 10 years.  Will be due in 2029.        Follow-up  A follow-up appointment is scheduled for 3 months from now.    ---    Chief Complaint   Patient presents with    Abdominal Pain     Pain located at lower part of stomach, gas, sometimes cramping       HPI:   Jake Hines is a 59 y.o. male presenting to [facility] care.   History of Present Illness  The patient is a 59-year-old male with a past medical history of celiac disease and Patricio's esophagus, who is presenting for evaluation of abdominal pain. The  patient is referred by Dr. Nielsen for evaluation of abdominal pain. He is accompanied by his wife.    The patient recently relocated to this area and has since established care with a new physician. He reports frequent bowel movements, occurring 8 to 10 times daily, accompanied by occasional diarrhea. His medical history includes celiac disease and Patricio's esophagus. His last endoscopy was conducted within the previous year, with a follow-up scheduled in 3 years. His current medication regimen includes omeprazole, which effectively manages his heartburn symptoms. He denies experiencing dysphagia or regular vomiting. He also reports lower abdominal cramps and back pain, which he believes are interconnected. His last colonoscopy was conducted approximately 5 years ago, with a 10-year follow-up recommended. The patient's wife reports that his celiac disease is the most severe, despite his physician's lack of associated illness. The patient suspects that his frequent bowel movements may be related to his celiac disease. He has attempted to eliminate gluten from his diet in the past, but it has been a significant period since his last attempt. His bowel movements have been ongoing for several years, despite his long-term gluten intake. Biopsies were found during the endoscopy. His symptoms are significantly impacting his quality of life, including travel. His symptoms are most severe during the first meal of the day, with occasional constipation.    Answers submitted by the patient for this visit:  Abdominal Pain Questionnaire (Submitted on 7/2/2024)  Chief Complaint: Abdominal pain  Chronicity: recurrent  Onset: more than 1 year ago  Progression since onset: waxing and waning  Pain location: suprapubic region  Pain - numeric: 5/10  Pain quality: cramping  Radiates to: back  anorexia: No  arthralgias: Yes  belching: Yes  constipation: No  diarrhea: No  dysuria: No  fever: No  flatus: Yes  frequency: No  headaches:  "No  hematochezia: No  hematuria: No  melena: No  myalgias: No  nausea: No  weight loss: No  vomiting: No  Relieved by: belching, bowel movements, certain positions, passing flatus    Historical Information   Past Medical History:   Diagnosis Date    Closed T10 fracture (HCC) 12/28/2023    Hypertension      Past Surgical History:   Procedure Laterality Date    HERNIA REPAIR      TONSILLECTOMY       Social History     Substance and Sexual Activity   Alcohol Use Yes    Alcohol/week: 7.0 - 8.0 standard drinks of alcohol    Types: 7 - 8 Standard drinks or equivalent per week     Social History     Substance and Sexual Activity   Drug Use Not Currently     Social History     Tobacco Use   Smoking Status Never    Passive exposure: Past   Smokeless Tobacco Never     Family History   Problem Relation Age of Onset    Colon polyps Neg Hx     Colon cancer Neg Hx        Meds/Allergies     Current Outpatient Medications:     amLODIPine (NORVASC) 5 mg tablet    Ibuprofen (ADVIL PO)    loratadine (CLARITIN) 10 mg tablet    omeprazole (PriLOSEC) 40 MG capsule    ramipril (ALTACE) 5 mg capsule    rosuvastatin (CRESTOR) 10 MG tablet    predniSONE 10 mg tablet  No Known Allergies    PHYSICAL EXAM:    Blood pressure 150/90, height 5' 7.5\" (1.715 m), weight 98.3 kg (216 lb 12.8 oz). Body mass index is 33.45 kg/m².  Physical Exam      General Appearance: No apparent distress, cooperative, alert.  Eyes: Anicteric.  Gastrointestinal: Soft, non-tender, non-distended; normal bowel sounds; no masses, no organomegaly.    Rectal: Deferred.  Musculoskeletal: No edema.  Skin: No jaundice.     OTHER LAB RESULTS:   Lab Results   Component Value Date    WBC 6.9 08/28/2023    HGB 15.4 08/28/2023    MCV 91 08/28/2023     08/28/2023     Lab Results   Component Value Date    K 4.3 08/28/2023     08/28/2023    CO2 26 08/28/2023    BUN 16 08/28/2023    CREATININE 0.89 08/28/2023    CALCIUM 8.9 12/02/2019    AST 19 08/28/2023    AST 21 " "12/02/2019    AST 28 09/14/2018    ALT 20 08/28/2023    ALT 20 12/02/2019    ALT 33 09/14/2018    ALKPHOS 95 12/02/2019    ALKPHOS 103 09/14/2018    EGFR 98 08/28/2023     Lab Results   Component Value Date    FERRITIN 166 09/14/2018     No results found for: \"LIPASE\"    OTHER RADIOLOGY RESULTS:   No results found.    I have spent 45 minutes today on the date of visit which was spent on one or more of the following: obtaining and reviewing separately obtained history, performing a medically appropriate examination and evaluation, counseling and educating the patient, ordering medications, tests, and procedures, documenting clinical information in the electronic or other health record, and care coordination.  "

## 2024-07-05 ENCOUNTER — APPOINTMENT (OUTPATIENT)
Dept: LAB | Facility: CLINIC | Age: 59
End: 2024-07-05
Payer: COMMERCIAL

## 2024-07-05 DIAGNOSIS — Z12.5 SCREENING FOR PROSTATE CANCER: ICD-10-CM

## 2024-07-05 DIAGNOSIS — E78.5 HYPERLIPIDEMIA, UNSPECIFIED HYPERLIPIDEMIA TYPE: ICD-10-CM

## 2024-07-05 DIAGNOSIS — K90.0 CELIAC DISEASE: ICD-10-CM

## 2024-07-05 DIAGNOSIS — Z13.6 SCREENING FOR CARDIOVASCULAR CONDITION: ICD-10-CM

## 2024-07-05 LAB
ALBUMIN SERPL BCG-MCNC: 4.2 G/DL (ref 3.5–5)
ALP SERPL-CCNC: 93 U/L (ref 34–104)
ALT SERPL W P-5'-P-CCNC: 22 U/L (ref 7–52)
ANION GAP SERPL CALCULATED.3IONS-SCNC: 11 MMOL/L (ref 4–13)
AST SERPL W P-5'-P-CCNC: 22 U/L (ref 13–39)
BASOPHILS # BLD AUTO: 0.04 THOUSANDS/ÂΜL (ref 0–0.1)
BASOPHILS NFR BLD AUTO: 1 % (ref 0–1)
BILIRUB SERPL-MCNC: 1.01 MG/DL (ref 0.2–1)
BILIRUB UR QL STRIP: NEGATIVE
BUN SERPL-MCNC: 14 MG/DL (ref 5–25)
CALCIUM SERPL-MCNC: 9.3 MG/DL (ref 8.4–10.2)
CHLORIDE SERPL-SCNC: 102 MMOL/L (ref 96–108)
CHOLEST SERPL-MCNC: 154 MG/DL
CLARITY UR: CLEAR
CO2 SERPL-SCNC: 28 MMOL/L (ref 21–32)
COLOR UR: COLORLESS
CREAT SERPL-MCNC: 0.89 MG/DL (ref 0.6–1.3)
CRP SERPL QL: <1 MG/L
EOSINOPHIL # BLD AUTO: 0.24 THOUSAND/ÂΜL (ref 0–0.61)
EOSINOPHIL NFR BLD AUTO: 4 % (ref 0–6)
ERYTHROCYTE [DISTWIDTH] IN BLOOD BY AUTOMATED COUNT: 11.8 % (ref 11.6–15.1)
GFR SERPL CREATININE-BSD FRML MDRD: 92 ML/MIN/1.73SQ M
GLUCOSE P FAST SERPL-MCNC: 90 MG/DL (ref 65–99)
GLUCOSE UR STRIP-MCNC: NEGATIVE MG/DL
HCT VFR BLD AUTO: 51 % (ref 36.5–49.3)
HDLC SERPL-MCNC: 68 MG/DL
HGB BLD-MCNC: 16.8 G/DL (ref 12–17)
HGB UR QL STRIP.AUTO: NEGATIVE
IMM GRANULOCYTES # BLD AUTO: 0.02 THOUSAND/UL (ref 0–0.2)
IMM GRANULOCYTES NFR BLD AUTO: 0 % (ref 0–2)
KETONES UR STRIP-MCNC: NEGATIVE MG/DL
LDLC SERPL CALC-MCNC: 70 MG/DL (ref 0–100)
LEUKOCYTE ESTERASE UR QL STRIP: NEGATIVE
LYMPHOCYTES # BLD AUTO: 2.1 THOUSANDS/ÂΜL (ref 0.6–4.47)
LYMPHOCYTES NFR BLD AUTO: 35 % (ref 14–44)
MCH RBC QN AUTO: 30.9 PG (ref 26.8–34.3)
MCHC RBC AUTO-ENTMCNC: 32.9 G/DL (ref 31.4–37.4)
MCV RBC AUTO: 94 FL (ref 82–98)
MONOCYTES # BLD AUTO: 0.8 THOUSAND/ÂΜL (ref 0.17–1.22)
MONOCYTES NFR BLD AUTO: 13 % (ref 4–12)
NEUTROPHILS # BLD AUTO: 2.85 THOUSANDS/ÂΜL (ref 1.85–7.62)
NEUTS SEG NFR BLD AUTO: 47 % (ref 43–75)
NITRITE UR QL STRIP: NEGATIVE
NONHDLC SERPL-MCNC: 86 MG/DL
NRBC BLD AUTO-RTO: 0 /100 WBCS
PH UR STRIP.AUTO: 7 [PH]
PLATELET # BLD AUTO: 186 THOUSANDS/UL (ref 149–390)
PMV BLD AUTO: 11 FL (ref 8.9–12.7)
POTASSIUM SERPL-SCNC: 4.1 MMOL/L (ref 3.5–5.3)
PROT SERPL-MCNC: 7.6 G/DL (ref 6.4–8.4)
PROT UR STRIP-MCNC: NEGATIVE MG/DL
PSA SERPL-MCNC: 0.38 NG/ML (ref 0–4)
RBC # BLD AUTO: 5.43 MILLION/UL (ref 3.88–5.62)
SODIUM SERPL-SCNC: 141 MMOL/L (ref 135–147)
SP GR UR STRIP.AUTO: 1 (ref 1–1.03)
TRIGL SERPL-MCNC: 82 MG/DL
TSH SERPL DL<=0.05 MIU/L-ACNC: 2.94 UIU/ML (ref 0.45–4.5)
UROBILINOGEN UR STRIP-ACNC: <2 MG/DL
WBC # BLD AUTO: 6.05 THOUSAND/UL (ref 4.31–10.16)

## 2024-07-05 PROCEDURE — 86140 C-REACTIVE PROTEIN: CPT

## 2024-07-05 PROCEDURE — 85025 COMPLETE CBC W/AUTO DIFF WBC: CPT

## 2024-07-05 PROCEDURE — 80061 LIPID PANEL: CPT

## 2024-07-05 PROCEDURE — 80053 COMPREHEN METABOLIC PANEL: CPT

## 2024-07-05 PROCEDURE — G0103 PSA SCREENING: HCPCS

## 2024-07-05 PROCEDURE — 81003 URINALYSIS AUTO W/O SCOPE: CPT

## 2024-07-05 PROCEDURE — 36415 COLL VENOUS BLD VENIPUNCTURE: CPT

## 2024-07-05 PROCEDURE — 84443 ASSAY THYROID STIM HORMONE: CPT

## 2024-07-06 ENCOUNTER — APPOINTMENT (OUTPATIENT)
Dept: LAB | Facility: CLINIC | Age: 59
End: 2024-07-06
Payer: COMMERCIAL

## 2024-07-06 DIAGNOSIS — K90.0 CELIAC DISEASE: ICD-10-CM

## 2024-07-06 PROCEDURE — 82653 EL-1 FECAL QUANTITATIVE: CPT

## 2024-07-06 PROCEDURE — 83993 ASSAY FOR CALPROTECTIN FECAL: CPT

## 2024-07-06 PROCEDURE — 87505 NFCT AGENT DETECTION GI: CPT

## 2024-07-07 LAB
C COLI+JEJUNI TUF STL QL NAA+PROBE: NEGATIVE
EC STX1+STX2 GENES STL QL NAA+PROBE: NEGATIVE
SALMONELLA SP SPAO STL QL NAA+PROBE: NEGATIVE
SHIGELLA SP+EIEC IPAH STL QL NAA+PROBE: NEGATIVE

## 2024-07-08 LAB — G LAMBLIA AG STL QL IA: NEGATIVE

## 2024-07-12 LAB
CALPROTECTIN STL-MCNT: 40 UG/G (ref 0–120)
ELASTASE PANC STL-MCNT: 430 UG ELAST./G

## 2024-07-14 DIAGNOSIS — I10 PRIMARY HYPERTENSION: ICD-10-CM

## 2024-07-14 DIAGNOSIS — E78.49 OTHER HYPERLIPIDEMIA: ICD-10-CM

## 2024-07-14 RX ORDER — RAMIPRIL 5 MG/1
5 CAPSULE ORAL DAILY
Qty: 30 CAPSULE | Refills: 0 | Status: SHIPPED | OUTPATIENT
Start: 2024-07-14

## 2024-07-14 RX ORDER — ROSUVASTATIN CALCIUM 10 MG/1
10 TABLET, COATED ORAL
Qty: 30 TABLET | Refills: 0 | Status: SHIPPED | OUTPATIENT
Start: 2024-07-14

## 2024-07-18 ENCOUNTER — VBI (OUTPATIENT)
Dept: ADMINISTRATIVE | Facility: OTHER | Age: 59
End: 2024-07-18

## 2024-08-16 DIAGNOSIS — E78.49 OTHER HYPERLIPIDEMIA: ICD-10-CM

## 2024-08-16 DIAGNOSIS — I10 PRIMARY HYPERTENSION: ICD-10-CM

## 2024-08-16 RX ORDER — ROSUVASTATIN CALCIUM 10 MG/1
10 TABLET, COATED ORAL
Qty: 90 TABLET | Refills: 1 | Status: SHIPPED | OUTPATIENT
Start: 2024-08-16

## 2024-08-16 RX ORDER — RAMIPRIL 5 MG/1
5 CAPSULE ORAL DAILY
Qty: 90 CAPSULE | Refills: 1 | Status: SHIPPED | OUTPATIENT
Start: 2024-08-16

## 2024-09-11 ENCOUNTER — OFFICE VISIT (OUTPATIENT)
Age: 59
End: 2024-09-11
Payer: COMMERCIAL

## 2024-09-11 ENCOUNTER — TELEPHONE (OUTPATIENT)
Age: 59
End: 2024-09-11

## 2024-09-11 VITALS
DIASTOLIC BLOOD PRESSURE: 82 MMHG | HEIGHT: 68 IN | BODY MASS INDEX: 32.43 KG/M2 | SYSTOLIC BLOOD PRESSURE: 130 MMHG | WEIGHT: 214 LBS

## 2024-09-11 DIAGNOSIS — K90.0 CELIAC DISEASE: Primary | ICD-10-CM

## 2024-09-11 DIAGNOSIS — R19.7 DIARRHEA, UNSPECIFIED TYPE: ICD-10-CM

## 2024-09-11 DIAGNOSIS — K22.70 BARRETT'S ESOPHAGUS WITHOUT DYSPLASIA: ICD-10-CM

## 2024-09-11 DIAGNOSIS — K21.9 GASTROESOPHAGEAL REFLUX DISEASE, UNSPECIFIED WHETHER ESOPHAGITIS PRESENT: ICD-10-CM

## 2024-09-11 PROCEDURE — 99214 OFFICE O/P EST MOD 30 MIN: CPT | Performed by: INTERNAL MEDICINE

## 2024-09-11 NOTE — TELEPHONE ENCOUNTER
Scheduled date of combo (as of today): 9/17/2024  Physician performing combo: LEXIE  Location of combo: [facility]  Bowel prep reviewed with patient: Clenpiq  Instructions reviewed with patient by: SONAM  Clearances: N

## 2024-09-11 NOTE — H&P (VIEW-ONLY)
[facility] - Outpatient Follow-up Note  Jake Hines 59 y.o. male MRN: [id number]  Encounter: [id number]    ASSESSMENT AND PLAN:    1. Celiac disease  -     EGD; Future; Expected date: 09/11/2024  2. Patricio's esophagus without dysplasia  -     EGD; Future; Expected date: 09/11/2024  3. Diarrhea, unspecified type  -     Colonoscopy; Future; Expected date: 09/11/2024  -     EGD; Future; Expected date: 09/11/2024  -     sodium picosulfate, magnesium oxide, citric acid (Clenpiq) oral solution; Take 175 mL (1 bottle) the evening before the colonoscopy, between 5 PM and 9 PM, followed by a second 175 mL bottle 5 hours before the colonoscopy.  4. Gastroesophageal reflux disease, unspecified whether esophagitis present  -     EGD; Future; Expected date: 09/11/2024    Assessment & Plan  1. Diarrhea  He continues to experience persistent diarrhea and cramping despite normal blood and stool tests and avoiding gluten. A colonoscopy is recommended to further investigate the cause. The possibility of inflammatory bowel disease, such as Crohn's disease or ulcerative colitis, is considered due to his age. The potential impact of stress on his condition was discussed.  The potential role of sugary products like sorbitol in causing diarrhea was also discussed. He is advised to maintain a clean diet leading up to the colonoscopy, avoiding anything outside of his regular meals. He should continue taking one Imodium daily if it effectively controls his symptoms. He is also advised to avoid alcohol, sugars, and sugar alternatives like stevia, as they can cause diarrhea. Instructions for the colonoscopy will be provided. If the colonoscopy results are negative and symptoms persist, IBS may be considered as a potential cause.    2. Patricio's Esophagus.  He has a history of Patricio's esophagus without dysplasia and is due for evaluation every 3 to 5 years. An endoscopy is recommended within the next 2  to 3 weeks to assess the current status and ensure there is no progression.    3. Gastroesophageal Reflux Disease (GERD).  He is currently on omeprazole 40 mg once daily. He should continue this medication as prescribed.        ---    Chief Complaint   Patient presents with    Diarrhea     Pt is experiencing diarrhea and cramping. Pt was gluten free for 6 weeks, no improvement.       HPI:   Jake Hines is a 59 y.o. male presenting for follow up.   History of Present Illness  The patient is a 59-year-old male presenting for a follow-up visit. He was last seen in the office on 07/03/2024 for celiac disease and a history of Patricio's esophagus without dysplasia and GERD.    He reports no significant changes in his condition. Despite eliminating gluten from his diet, he continues to experience stomach cramps and diarrhea, necessitating frequent bathroom visits, up to 10 times a day. He reports no presence of blood in his stool. The cramping and abdominal pain occur sporadically throughout the day, with no discernible pattern. A food log was maintained to identify potential triggers, but no clear correlation was found. He suspects milk might be a trigger, but has consumed it without issue on occasion. His symptoms can disappear for up to 2 weeks before suddenly reappearing. His bowel movements vary, sometimes being loose and watery, other times solid. He does not typically wake up during the night due to these symptoms. He occasionally takes Imodium, which provides some relief. He also takes Metamucil and a probiotic every morning. He admits to consuming simple sugar and etoh.    He was diagnosed with celiac disease approximately 12 years ago at a different GI office. He is currently on omeprazole 40 mg once daily. He last had an endoscopy in 2021. He last had a colonoscopy about 5 years ago and stated that he is due for another in 2029. Recent blood and stool work showed unremarkable CBC, CRP, TSH, G RDS, stool  "enteric panel, fecal calprotectin, and fecal elastase testing.    He is accompanied by his wife       Historical Information   Past Medical History:   Diagnosis Date    Patricio esophagus     Celiac disease     Closed T10 fracture (HCC) 2023    GERD (gastroesophageal reflux disease)     Hernia     Hypertension      Past Surgical History:   Procedure Laterality Date    COLONOSCOPY      HERNIA REPAIR      TONSILLECTOMY       Social History     Substance and Sexual Activity   Alcohol Use Yes    Alcohol/week: 8.0 standard drinks of alcohol    Types: 8 Shots of liquor per week     Social History     Substance and Sexual Activity   Drug Use Never     Social History     Tobacco Use   Smoking Status Never    Passive exposure: Past   Smokeless Tobacco Never     Family History   Problem Relation Age of Onset    Early death Father          age 57 massive heart attack    Hypertension Father     Early death Brother          age 62 heart attack    Hypertension Brother     Colon polyps Neg Hx     Colon cancer Neg Hx        Meds/Allergies     Current Outpatient Medications:     amLODIPine (NORVASC) 5 mg tablet    loratadine (CLARITIN) 10 mg tablet    omeprazole (PriLOSEC) 40 MG capsule    ramipril (ALTACE) 5 mg capsule    rosuvastatin (CRESTOR) 10 MG tablet    sodium picosulfate, magnesium oxide, citric acid (Clenpiq) oral solution  No Known Allergies    PHYSICAL EXAM:    Blood pressure 130/82, height 5' 7.5\" (1.715 m), weight 97.1 kg (214 lb). Body mass index is 33.02 kg/m².  Physical Exam    General Appearance: No apparent distress, cooperative, alert.  Eyes: Anicteric.  Gastrointestinal: Soft, non-tender, non-distended; normal bowel sounds; no masses, no organomegaly.    Rectal: Deferred.  Musculoskeletal: No edema.  Skin: No jaundice.     OTHER LAB RESULTS:   Lab Results   Component Value Date    WBC 6.05 2024    WBC 6.9 2023    HGB 16.8 2024    HGB 15.4 2023    MCV 94 2024     " "07/05/2024     08/28/2023     Lab Results   Component Value Date    K 4.1 07/05/2024     07/05/2024    CO2 28 07/05/2024    BUN 14 07/05/2024    CREATININE 0.89 07/05/2024    GLUF 90 07/05/2024    CALCIUM 9.3 07/05/2024    AST 22 07/05/2024    AST 19 08/28/2023    AST 21 12/02/2019    ALT 22 07/05/2024    ALT 20 08/28/2023    ALT 20 12/02/2019    ALKPHOS 93 07/05/2024    ALKPHOS 95 12/02/2019    ALKPHOS 103 09/14/2018    ALB 4.2 07/05/2024    TBILI 1.01 (H) 07/05/2024    TBILI 0.8 08/28/2023    TBILI 0.8 12/02/2019    EGFR 92 07/05/2024     Lab Results   Component Value Date    FERRITIN 166 09/14/2018     No results found for: \"LIPASE\"    OTHER RADIOLOGY RESULTS:   No results found.    I have spent 31 minutes today on the date of visit which was spent on one or more of the following: obtaining and reviewing separately obtained history, performing a medically appropriate examination and evaluation, counseling and educating the patient, ordering medications, tests, and procedures, documenting clinical information in the electronic or other health record, and care coordination.  "

## 2024-09-11 NOTE — PROGRESS NOTES
[facility] - Outpatient Follow-up Note  Jake Hines 59 y.o. male MRN: [id number]  Encounter: [id number]    ASSESSMENT AND PLAN:    1. Celiac disease  -     EGD; Future; Expected date: 09/11/2024  2. Patricio's esophagus without dysplasia  -     EGD; Future; Expected date: 09/11/2024  3. Diarrhea, unspecified type  -     Colonoscopy; Future; Expected date: 09/11/2024  -     EGD; Future; Expected date: 09/11/2024  -     sodium picosulfate, magnesium oxide, citric acid (Clenpiq) oral solution; Take 175 mL (1 bottle) the evening before the colonoscopy, between 5 PM and 9 PM, followed by a second 175 mL bottle 5 hours before the colonoscopy.  4. Gastroesophageal reflux disease, unspecified whether esophagitis present  -     EGD; Future; Expected date: 09/11/2024    Assessment & Plan  1. Diarrhea  He continues to experience persistent diarrhea and cramping despite normal blood and stool tests and avoiding gluten. A colonoscopy is recommended to further investigate the cause. The possibility of inflammatory bowel disease, such as Crohn's disease or ulcerative colitis, is considered due to his age. The potential impact of stress on his condition was discussed.  The potential role of sugary products like sorbitol in causing diarrhea was also discussed. He is advised to maintain a clean diet leading up to the colonoscopy, avoiding anything outside of his regular meals. He should continue taking one Imodium daily if it effectively controls his symptoms. He is also advised to avoid alcohol, sugars, and sugar alternatives like stevia, as they can cause diarrhea. Instructions for the colonoscopy will be provided. If the colonoscopy results are negative and symptoms persist, IBS may be considered as a potential cause.    2. Patricio's Esophagus.  He has a history of Patricio's esophagus without dysplasia and is due for evaluation every 3 to 5 years. An endoscopy is recommended within the next 2  to 3 weeks to assess the current status and ensure there is no progression.    3. Gastroesophageal Reflux Disease (GERD).  He is currently on omeprazole 40 mg once daily. He should continue this medication as prescribed.        ---    Chief Complaint   Patient presents with    Diarrhea     Pt is experiencing diarrhea and cramping. Pt was gluten free for 6 weeks, no improvement.       HPI:   Jake Hines is a 59 y.o. male presenting for follow up.   History of Present Illness  The patient is a 59-year-old male presenting for a follow-up visit. He was last seen in the office on 07/03/2024 for celiac disease and a history of Patricio's esophagus without dysplasia and GERD.    He reports no significant changes in his condition. Despite eliminating gluten from his diet, he continues to experience stomach cramps and diarrhea, necessitating frequent bathroom visits, up to 10 times a day. He reports no presence of blood in his stool. The cramping and abdominal pain occur sporadically throughout the day, with no discernible pattern. A food log was maintained to identify potential triggers, but no clear correlation was found. He suspects milk might be a trigger, but has consumed it without issue on occasion. His symptoms can disappear for up to 2 weeks before suddenly reappearing. His bowel movements vary, sometimes being loose and watery, other times solid. He does not typically wake up during the night due to these symptoms. He occasionally takes Imodium, which provides some relief. He also takes Metamucil and a probiotic every morning. He admits to consuming simple sugar and etoh.    He was diagnosed with celiac disease approximately 12 years ago at a different GI office. He is currently on omeprazole 40 mg once daily. He last had an endoscopy in 2021. He last had a colonoscopy about 5 years ago and stated that he is due for another in 2029. Recent blood and stool work showed unremarkable CBC, CRP, TSH, G RDS, stool  "enteric panel, fecal calprotectin, and fecal elastase testing.    He is accompanied by his wife       Historical Information   Past Medical History:   Diagnosis Date    Patricio esophagus     Celiac disease     Closed T10 fracture (HCC) 2023    GERD (gastroesophageal reflux disease)     Hernia     Hypertension      Past Surgical History:   Procedure Laterality Date    COLONOSCOPY      HERNIA REPAIR      TONSILLECTOMY       Social History     Substance and Sexual Activity   Alcohol Use Yes    Alcohol/week: 8.0 standard drinks of alcohol    Types: 8 Shots of liquor per week     Social History     Substance and Sexual Activity   Drug Use Never     Social History     Tobacco Use   Smoking Status Never    Passive exposure: Past   Smokeless Tobacco Never     Family History   Problem Relation Age of Onset    Early death Father          age 57 massive heart attack    Hypertension Father     Early death Brother          age 62 heart attack    Hypertension Brother     Colon polyps Neg Hx     Colon cancer Neg Hx        Meds/Allergies     Current Outpatient Medications:     amLODIPine (NORVASC) 5 mg tablet    loratadine (CLARITIN) 10 mg tablet    omeprazole (PriLOSEC) 40 MG capsule    ramipril (ALTACE) 5 mg capsule    rosuvastatin (CRESTOR) 10 MG tablet    sodium picosulfate, magnesium oxide, citric acid (Clenpiq) oral solution  No Known Allergies    PHYSICAL EXAM:    Blood pressure 130/82, height 5' 7.5\" (1.715 m), weight 97.1 kg (214 lb). Body mass index is 33.02 kg/m².  Physical Exam    General Appearance: No apparent distress, cooperative, alert.  Eyes: Anicteric.  Gastrointestinal: Soft, non-tender, non-distended; normal bowel sounds; no masses, no organomegaly.    Rectal: Deferred.  Musculoskeletal: No edema.  Skin: No jaundice.     OTHER LAB RESULTS:   Lab Results   Component Value Date    WBC 6.05 2024    WBC 6.9 2023    HGB 16.8 2024    HGB 15.4 2023    MCV 94 2024     " "07/05/2024     08/28/2023     Lab Results   Component Value Date    K 4.1 07/05/2024     07/05/2024    CO2 28 07/05/2024    BUN 14 07/05/2024    CREATININE 0.89 07/05/2024    GLUF 90 07/05/2024    CALCIUM 9.3 07/05/2024    AST 22 07/05/2024    AST 19 08/28/2023    AST 21 12/02/2019    ALT 22 07/05/2024    ALT 20 08/28/2023    ALT 20 12/02/2019    ALKPHOS 93 07/05/2024    ALKPHOS 95 12/02/2019    ALKPHOS 103 09/14/2018    ALB 4.2 07/05/2024    TBILI 1.01 (H) 07/05/2024    TBILI 0.8 08/28/2023    TBILI 0.8 12/02/2019    EGFR 92 07/05/2024     Lab Results   Component Value Date    FERRITIN 166 09/14/2018     No results found for: \"LIPASE\"    OTHER RADIOLOGY RESULTS:   No results found.    I have spent 31 minutes today on the date of visit which was spent on one or more of the following: obtaining and reviewing separately obtained history, performing a medically appropriate examination and evaluation, counseling and educating the patient, ordering medications, tests, and procedures, documenting clinical information in the electronic or other health record, and care coordination.  "

## 2024-09-15 DIAGNOSIS — I10 PRIMARY HYPERTENSION: ICD-10-CM

## 2024-09-17 ENCOUNTER — ANESTHESIA (OUTPATIENT)
Dept: GASTROENTEROLOGY | Facility: HOSPITAL | Age: 59
End: 2024-09-17
Payer: COMMERCIAL

## 2024-09-17 ENCOUNTER — HOSPITAL ENCOUNTER (OUTPATIENT)
Dept: GASTROENTEROLOGY | Facility: HOSPITAL | Age: 59
Setting detail: OUTPATIENT SURGERY
Discharge: HOME | End: 2024-09-17
Attending: INTERNAL MEDICINE
Payer: COMMERCIAL

## 2024-09-17 ENCOUNTER — ANESTHESIA EVENT (OUTPATIENT)
Dept: GASTROENTEROLOGY | Facility: HOSPITAL | Age: 59
End: 2024-09-17
Payer: COMMERCIAL

## 2024-09-17 VITALS
BODY MASS INDEX: 31.07 KG/M2 | DIASTOLIC BLOOD PRESSURE: 88 MMHG | TEMPERATURE: 98.3 F | HEIGHT: 68 IN | SYSTOLIC BLOOD PRESSURE: 147 MMHG | OXYGEN SATURATION: 98 % | WEIGHT: 205 LBS | HEART RATE: 65 BPM | RESPIRATION RATE: 16 BRPM

## 2024-09-17 DIAGNOSIS — K21.9 GASTROESOPHAGEAL REFLUX DISEASE, UNSPECIFIED WHETHER ESOPHAGITIS PRESENT: ICD-10-CM

## 2024-09-17 DIAGNOSIS — R19.7 DIARRHEA, UNSPECIFIED TYPE: ICD-10-CM

## 2024-09-17 DIAGNOSIS — K22.70 BARRETT'S ESOPHAGUS WITHOUT DYSPLASIA: ICD-10-CM

## 2024-09-17 DIAGNOSIS — K90.0 CELIAC DISEASE: ICD-10-CM

## 2024-09-17 PROCEDURE — 43239 EGD BIOPSY SINGLE/MULTIPLE: CPT | Performed by: INTERNAL MEDICINE

## 2024-09-17 PROCEDURE — 45385 COLONOSCOPY W/LESION REMOVAL: CPT | Performed by: INTERNAL MEDICINE

## 2024-09-17 PROCEDURE — 45380 COLONOSCOPY AND BIOPSY: CPT | Performed by: INTERNAL MEDICINE

## 2024-09-17 PROCEDURE — 88305 TISSUE EXAM BY PATHOLOGIST: CPT | Performed by: PATHOLOGY

## 2024-09-17 RX ORDER — PROPOFOL 10 MG/ML
INJECTION, EMULSION INTRAVENOUS CONTINUOUS PRN
Status: DISCONTINUED | OUTPATIENT
Start: 2024-09-17 | End: 2024-09-17

## 2024-09-17 RX ORDER — AMLODIPINE BESYLATE 5 MG/1
5 TABLET ORAL DAILY
Qty: 90 TABLET | Refills: 1 | Status: SHIPPED | OUTPATIENT
Start: 2024-09-17

## 2024-09-17 RX ORDER — PROPOFOL 10 MG/ML
INJECTION, EMULSION INTRAVENOUS AS NEEDED
Status: DISCONTINUED | OUTPATIENT
Start: 2024-09-17 | End: 2024-09-17

## 2024-09-17 RX ORDER — SODIUM CHLORIDE 9 MG/ML
INJECTION, SOLUTION INTRAVENOUS CONTINUOUS PRN
Status: DISCONTINUED | OUTPATIENT
Start: 2024-09-17 | End: 2024-09-17

## 2024-09-17 RX ADMIN — PROPOFOL 120 MCG/KG/MIN: 10 INJECTION, EMULSION INTRAVENOUS at 14:05

## 2024-09-17 RX ADMIN — PROPOFOL 150 MG: 10 INJECTION, EMULSION INTRAVENOUS at 14:05

## 2024-09-17 RX ADMIN — SODIUM CHLORIDE: 0.9 INJECTION, SOLUTION INTRAVENOUS at 14:01

## 2024-09-17 NOTE — ANESTHESIA PREPROCEDURE EVALUATION
Procedure:  COLONOSCOPY  EGD    Relevant Problems   CARDIO   (+) Benign essential hypertension   (+) Hypertension   (+) Mild aortic regurgitation   (+) Other hyperlipidemia      /RENAL   (+) Benign prostatic hyperplasia with urinary obstruction   (+) Hydronephrosis   (+) Kidney stone      NEURO/PSYCH   (+) Depressive disorder, not elsewhere classified      PULMONARY   (+) Sleep apnea (S/p UPPP, resolved)        Physical Exam    Airway    Mallampati score: II  TM Distance: >3 FB  Neck ROM: full     Dental   No notable dental hx     Cardiovascular      Pulmonary      Other Findings        Anesthesia Plan  ASA Score- 3     Anesthesia Type- IV sedation with anesthesia with ASA Monitors.         Additional Monitors:     Airway Plan:            Plan Factors-Exercise tolerance (METS): >4 METS.    Chart reviewed.    Patient summary reviewed.    Patient is not a current smoker.              Induction- intravenous.    Postoperative Plan-         Informed Consent- Anesthetic plan and risks discussed with patient.  I personally reviewed this patient with the CRNA. Discussed and agreed on the Anesthesia Plan with the CRNA..

## 2024-09-17 NOTE — INTERVAL H&P NOTE
H&P reviewed. After examining the patient I find no changes in the patients condition since the H&P had been written.    Vitals:    09/17/24 1328   BP: 166/82   Pulse: 64   Resp: 16   Temp: 98.3 °F (36.8 °C)   SpO2: 98%

## 2024-09-17 NOTE — ANESTHESIA POSTPROCEDURE EVALUATION
Post-Op Assessment Note    CV Status:  Stable  Pain Score: 0    Pain management: adequate       Mental Status:  Alert and awake   Hydration Status:  Stable   PONV Controlled:  None   Airway Patency:  Patent     Post Op Vitals Reviewed: Yes    No anethesia notable event occurred.    Staff: CRNA               /69 (09/17/24 1433)    Temp      Pulse 75 (09/17/24 1433)   Resp 16 (09/17/24 1433)    SpO2 96 % (09/17/24 1433)

## 2024-09-19 DIAGNOSIS — R19.7 DIARRHEA, UNSPECIFIED TYPE: Primary | ICD-10-CM

## 2024-09-19 PROCEDURE — 88305 TISSUE EXAM BY PATHOLOGIST: CPT | Performed by: PATHOLOGY

## 2024-09-19 RX ORDER — BUDESONIDE 3 MG/1
CAPSULE, COATED PELLETS ORAL
Qty: 132 CAPSULE | Refills: 0 | Status: SHIPPED | OUTPATIENT
Start: 2024-09-19 | End: 2024-11-16

## 2024-10-08 ENCOUNTER — TELEPHONE (OUTPATIENT)
Age: 59
End: 2024-10-08

## 2024-10-08 NOTE — TELEPHONE ENCOUNTER
Patients GI provider:  Dr. Bruno    Number to return call: [phone]    Reason for call: Pt called to cancel appt, but appt already cancelled    Scheduled procedure/appointment date if applicable: Apt/procedure N/A

## 2024-10-11 DIAGNOSIS — I10 PRIMARY HYPERTENSION: ICD-10-CM

## 2024-10-11 DIAGNOSIS — R19.7 DIARRHEA, UNSPECIFIED TYPE: ICD-10-CM

## 2024-10-14 RX ORDER — BUDESONIDE 3 MG/1
CAPSULE, COATED PELLETS ORAL
Qty: 270 CAPSULE | Refills: 1 | OUTPATIENT
Start: 2024-10-14 | End: 2024-12-11

## 2024-10-14 RX ORDER — AMLODIPINE BESYLATE 5 MG/1
5 TABLET ORAL DAILY
Qty: 90 TABLET | Refills: 1 | Status: SHIPPED | OUTPATIENT
Start: 2024-10-14 | End: 2024-10-23

## 2024-10-21 DIAGNOSIS — I10 PRIMARY HYPERTENSION: ICD-10-CM

## 2024-10-23 RX ORDER — AMLODIPINE BESYLATE 5 MG/1
5 TABLET ORAL DAILY
Qty: 90 TABLET | Refills: 2 | Status: SHIPPED | OUTPATIENT
Start: 2024-10-23

## 2025-01-06 ENCOUNTER — TELEPHONE (OUTPATIENT)
Dept: FAMILY MEDICINE CLINIC | Facility: CLINIC | Age: 60
End: 2025-01-06

## 2025-01-06 ENCOUNTER — PATIENT MESSAGE (OUTPATIENT)
Dept: FAMILY MEDICINE CLINIC | Facility: CLINIC | Age: 60
End: 2025-01-06

## 2025-01-06 DIAGNOSIS — K21.9 GASTROESOPHAGEAL REFLUX DISEASE WITHOUT ESOPHAGITIS: ICD-10-CM

## 2025-01-06 NOTE — TELEPHONE ENCOUNTER
Question on how to send form to DR.Patient does have my chart I stated send message to dr attach form add where needs to be sent once filled out

## 2025-01-13 RX ORDER — OMEPRAZOLE 40 MG/1
40 CAPSULE, DELAYED RELEASE ORAL DAILY
Qty: 90 CAPSULE | Refills: 3 | Status: SHIPPED | OUTPATIENT
Start: 2025-01-13

## 2025-01-13 NOTE — PATIENT COMMUNICATION
Pt called on update on refill and form to be filled out and faxed. Pt needs a refill on omeprazole (PriLOSEC) 40 MG capsule   Pt states he is almost out of the medication. Please advise

## 2025-01-15 ENCOUNTER — TELEPHONE (OUTPATIENT)
Age: 60
End: 2025-01-15

## 2025-01-15 NOTE — TELEPHONE ENCOUNTER
APPOINTMENTS MUST BE SCHEDULED A MINIMUM OF 14 DAYS PRIOR TO LEAVING FOR THEIR TRIP:??????   When are you traveling? March 14-26  Where specifically are you traveling to and for how long? (Must include entering country to leaving country) [place]  Are you scheduling this appointment for just you or a group of people?? If scheduling for a group, how many?? (Maximum of 6 individuals, more than 6 send message to office for approval) 2  Have you ever been seen in our office before??No  If yes, record must be found within patients’ chart  For our records: [facility] [address] [facility]   General Information:  Please be advised that we do not accept insurance for these visits. It will be out of pocket.  Payment is collected after your appointment with the physician and is based on his individual recommendations and what you receive here in the office.  Our provider will only offer what is required/highly recommended for your trip.  Previous patients who were already seen and only require malaria prophylaxis do not need scheduled visit. Send the medication request to the office for refill.  We do not carry many of the immunizations within our office due to how expensive they can be. Please note: immunizations will most likely need to be ordered and take up to two to three days to come in. We will call you when they arrive.  If you are being seen in a group: Please arrive 15 minutes prior to the earliest appointment time as you will be seen together.  Groups with 3 or more people:  Schedule 15-minute appointment for each patient at the end of the day.  If patient is 12 years old or younger:  Always book patient at the end of the day. Use more than one time slot as necessary.    - Please bring your itinerary with you and your yellow CDC card, if you have one. If you do not have a card, we will provide you with one.    - Please be aware that when you receive your vaccinations, you will be asked  to remain in the office for 15 minutes prior to getting your vaccine.

## 2025-02-17 ENCOUNTER — TELEPHONE (OUTPATIENT)
Dept: INFECTIOUS DISEASES | Facility: CLINIC | Age: 60
End: 2025-02-17

## 2025-02-17 NOTE — TELEPHONE ENCOUNTER
Called and spoke with patient today.   Reminded patient of upcoming [facility] appointment.     Also reminded patient to bring:  - Itinerary  - Yellow card if patient has one (one will be provided if patient does not have one)  - Updated vaccine records if not already in patients chart  - Reminder that this is a self pay clinic    Patient verbalizes understanding at this time.

## 2025-02-18 ENCOUNTER — OFFICE VISIT (OUTPATIENT)
Dept: INFECTIOUS DISEASES | Facility: CLINIC | Age: 60
End: 2025-02-18

## 2025-02-18 VITALS
RESPIRATION RATE: 18 BRPM | HEART RATE: 69 BPM | WEIGHT: 216.4 LBS | HEIGHT: 68 IN | TEMPERATURE: 97.1 F | BODY MASS INDEX: 32.8 KG/M2 | DIASTOLIC BLOOD PRESSURE: 72 MMHG | OXYGEN SATURATION: 97 % | SYSTOLIC BLOOD PRESSURE: 132 MMHG

## 2025-02-18 DIAGNOSIS — Z71.84 TRAVEL ADVICE ENCOUNTER: Primary | ICD-10-CM

## 2025-02-18 PROCEDURE — 99411 PREVENTIVE COUNSELING GROUP: CPT | Performed by: INTERNAL MEDICINE

## 2025-02-18 PROCEDURE — 90474 IMMUNE ADMIN ORAL/NASAL ADDL: CPT

## 2025-02-18 PROCEDURE — 90632 HEPA VACCINE ADULT IM: CPT

## 2025-02-18 PROCEDURE — 90690 TYPHOID VACCINE ORAL: CPT

## 2025-02-18 PROCEDURE — 90471 IMMUNIZATION ADMIN: CPT

## 2025-02-18 RX ORDER — ATOVAQUONE AND PROGUANIL HYDROCHLORIDE 250; 100 MG/1; MG/1
1 TABLET, FILM COATED ORAL
Qty: 19 TABLET | Refills: 1 | Status: SHIPPED | OUTPATIENT
Start: 2025-03-14 | End: 2025-04-02

## 2025-02-18 NOTE — PROGRESS NOTES
Travel Clinic    Patient is traveling to countries or areas within countries where immunizations are required or recommended:   [place], [place], [place]    Patient states they will visit underdeveloped areas with poor sanitation Yes/No: Yes   Patient requires malaria prophylaxis Yes/No: Yes    Orders Placed This Encounter   Procedures    Hepatitis A Vaccine Adult IM    Typhoid Vaccine Oral       Patient instructed how to take medications Yes/No: Yes  Patient warned about side effects Yes/No: Yes  Patient declined Yes/No: No

## 2025-03-02 DIAGNOSIS — I10 PRIMARY HYPERTENSION: ICD-10-CM

## 2025-03-03 ENCOUNTER — PATIENT MESSAGE (OUTPATIENT)
Dept: FAMILY MEDICINE CLINIC | Facility: CLINIC | Age: 60
End: 2025-03-03

## 2025-03-04 RX ORDER — AMLODIPINE BESYLATE 5 MG/1
5 TABLET ORAL DAILY
Qty: 90 TABLET | Refills: 1 | Status: SHIPPED | OUTPATIENT
Start: 2025-03-04

## 2025-03-29 DIAGNOSIS — I10 PRIMARY HYPERTENSION: ICD-10-CM

## 2025-03-31 RX ORDER — RAMIPRIL 5 MG/1
5 CAPSULE ORAL DAILY
Qty: 90 CAPSULE | Refills: 2 | OUTPATIENT
Start: 2025-03-31

## 2025-03-31 NOTE — TELEPHONE ENCOUNTER
Left message - patient needs to set up appt with Dr. Nielsen - pharmacy looking for a change in meds and 90 day script with 2 refills.

## 2025-04-02 ENCOUNTER — OFFICE VISIT (OUTPATIENT)
Dept: FAMILY MEDICINE CLINIC | Facility: CLINIC | Age: 60
End: 2025-04-02
Payer: COMMERCIAL

## 2025-04-02 VITALS
OXYGEN SATURATION: 97 % | SYSTOLIC BLOOD PRESSURE: 124 MMHG | DIASTOLIC BLOOD PRESSURE: 70 MMHG | HEIGHT: 68 IN | TEMPERATURE: 96.4 F | WEIGHT: 261.4 LBS | BODY MASS INDEX: 39.62 KG/M2 | HEART RATE: 73 BPM | RESPIRATION RATE: 16 BRPM

## 2025-04-02 DIAGNOSIS — G47.419 PRIMARY NARCOLEPSY WITHOUT CATAPLEXY: ICD-10-CM

## 2025-04-02 DIAGNOSIS — Z12.5 SCREENING FOR PROSTATE CANCER: ICD-10-CM

## 2025-04-02 DIAGNOSIS — I10 PRIMARY HYPERTENSION: ICD-10-CM

## 2025-04-02 DIAGNOSIS — E78.49 OTHER HYPERLIPIDEMIA: ICD-10-CM

## 2025-04-02 DIAGNOSIS — Z13.1 SCREENING FOR DIABETES MELLITUS: ICD-10-CM

## 2025-04-02 DIAGNOSIS — Z00.00 ANNUAL PHYSICAL EXAM: Primary | ICD-10-CM

## 2025-04-02 PROBLEM — S22.079A CLOSED T10 FRACTURE (HCC): Status: RESOLVED | Noted: 2023-12-28 | Resolved: 2025-04-02

## 2025-04-02 PROCEDURE — 99396 PREV VISIT EST AGE 40-64: CPT

## 2025-04-02 RX ORDER — AMLODIPINE BESYLATE 5 MG/1
5 TABLET ORAL DAILY
Qty: 90 TABLET | Refills: 1 | Status: SHIPPED | OUTPATIENT
Start: 2025-04-02

## 2025-04-02 RX ORDER — ROSUVASTATIN CALCIUM 10 MG/1
10 TABLET, COATED ORAL
Qty: 90 TABLET | Refills: 1 | Status: SHIPPED | OUTPATIENT
Start: 2025-04-02

## 2025-04-02 RX ORDER — RAMIPRIL 5 MG/1
5 CAPSULE ORAL DAILY
Qty: 90 CAPSULE | Refills: 1 | Status: SHIPPED | OUTPATIENT
Start: 2025-04-02

## 2025-04-02 NOTE — PATIENT INSTRUCTIONS
"Patient Education     Routine physical for adults   The Basics   Written by the doctors and editors at [facility]   What is a physical? -- A physical is a routine visit, or \"check-up,\" with your doctor. You might also hear it called a \"wellness visit\" or \"preventive visit.\"  During each visit, the doctor will:   Ask about your physical and mental health   Ask about your habits, behaviors, and lifestyle   Do an exam   Give you vaccines if needed   Talk to you about any medicines you take   Give advice about your health   Answer your questions  Getting regular check-ups is an important part of taking care of your health. It can help your doctor find and treat any problems you have. But it's also important for preventing health problems.  A routine physical is different from a \"sick visit.\" A sick visit is when you see a doctor because of a health concern or problem. Since physicals are scheduled ahead of time, you can think about what you want to ask the doctor.  How often should I get a physical? -- It depends on your age and health. In general, for people age 21 years and older:   If you are younger than 50 years, you might be able to get a physical every 3 years.   If you are 50 years or older, your doctor might recommend a physical every year.  If you have an ongoing health condition, like diabetes or high blood pressure, your doctor will probably want to see you more often.  What happens during a physical? -- In general, each visit will include:   Physical exam - The doctor or nurse will check your height, weight, heart rate, and blood pressure. They will also look at your eyes and ears. They will ask about how you are feeling and whether you have any symptoms that bother you.   Medicines - It's a good idea to bring a list of all the medicines you take to each doctor visit. Your doctor will talk to you about your medicines and answer any questions. Tell them if you are having any side effects that bother you. You " "should also tell them if you are having trouble paying for any of your medicines.   Habits and behaviors - This includes:   Your diet   Your exercise habits   Whether you smoke, drink alcohol, or use drugs   Whether you are sexually active   Whether you feel safe at home  Your doctor will talk to you about things you can do to improve your health and lower your risk of health problems. They will also offer help and support. For example, if you want to quit smoking, they can give you advice and might prescribe medicines. If you want to improve your diet or get more physical activity, they can help you with this, too.   Lab tests, if needed - The tests you get will depend on your age and situation. For example, your doctor might want to check your:   Cholesterol   Blood sugar   Iron level   Vaccines - The recommended vaccines will depend on your age, health, and what vaccines you already had. Vaccines are very important because they can prevent certain serious or deadly infections.   Discussion of screening - \"Screening\" means checking for diseases or other health problems before they cause symptoms. Your doctor can recommend screening based on your age, risk, and preferences. This might include tests to check for:   Cancer, such as breast, prostate, cervical, ovarian, colorectal, prostate, lung, or skin cancer   Sexually transmitted infections, such as chlamydia and gonorrhea   Mental health conditions like depression and anxiety  Your doctor will talk to you about the different types of screening tests. They can help you decide which screenings to have. They can also explain what the results might mean.   Answering questions - The physical is a good time to ask the doctor or nurse questions about your health. If needed, they can refer you to other doctors or specialists, too.  Adults older than 65 years often need other care, too. As you get older, your doctor will talk to you about:   How to prevent falling at " home   Hearing or vision tests   Memory testing   How to take your medicines safely   Making sure that you have the help and support you need at home  All topics are updated as new evidence becomes available and our peer review process is complete.  This topic retrieved from [employer] on: May 02, 2024.  Topic 645049 Version 1.0  Release: 32.4.3 - C32.122  © 2024 UpToDate, Inc. and/or its affiliates. All rights reserved.  Consumer Information Use and Disclaimer   Disclaimer: This generalized information is a limited summary of diagnosis, treatment, and/or medication information. It is not meant to be comprehensive and should be used as a tool to help the user understand and/or assess potential diagnostic and treatment options. It does NOT include all information about conditions, treatments, medications, side effects, or risks that may apply to a specific patient. It is not intended to be medical advice or a substitute for the medical advice, diagnosis, or treatment of a health care provider based on the health care provider's examination and assessment of a patient's specific and unique circumstances. Patients must speak with a health care provider for complete information about their health, medical questions, and treatment options, including any risks or benefits regarding use of medications. This information does not endorse any treatments or medications as safe, effective, or approved for treating a specific patient. UpToDate, Inc. and its affiliates disclaim any warranty or liability relating to this information or the use thereof.The use of this information is governed by the Terms of Use, available at https://www.[employer].com/en/know/clinical-effectiveness-terms. 2024© UpToDate, Inc. and its affiliates and/or licensors. All rights reserved.  Copyright   © 2024 UpToDate, Inc. and/or its affiliates. All rights reserved.

## 2025-04-02 NOTE — ASSESSMENT & PLAN NOTE
Stable. Home BP's 130s/80s. Continue amlodipine 5 mg and ramipril 5 mg. Refills today. The patient will obtain the lab work ordered today. The patient will be notified of results when available and also any further recommendations.  Continue home BP monitoring with parameters to call for consistent readings > 140/90. Continue heart healthy, low-sodium dietary modifications.  Orders:  •  amLODIPine (NORVASC) 5 mg tablet; Take 1 tablet (5 mg total) by mouth daily  •  ramipril (ALTACE) 5 mg capsule; Take 1 capsule (5 mg total) by mouth daily  •  CBC and differential; Future  •  Comprehensive metabolic panel; Future  •  Lipid Panel with Direct LDL reflex; Future

## 2025-04-02 NOTE — PROGRESS NOTES
Adult Annual Physical  Name: Jake Hines      : 1965      MRN: [id number]  Encounter Provider: GLENNA Veloz  Encounter Date: 2025   Encounter department: [facility]    Assessment & Plan  Annual physical exam         Primary hypertension  Stable. Home BP's 130s/80s. Continue amlodipine 5 mg and ramipril 5 mg. Refills today. The patient will obtain the lab work ordered today. The patient will be notified of results when available and also any further recommendations.  Continue home BP monitoring with parameters to call for consistent readings > 140/90. Continue heart healthy, low-sodium dietary modifications.  Orders:  •  amLODIPine (NORVASC) 5 mg tablet; Take 1 tablet (5 mg total) by mouth daily  •  ramipril (ALTACE) 5 mg capsule; Take 1 capsule (5 mg total) by mouth daily  •  CBC and differential; Future  •  Comprehensive metabolic panel; Future  •  Lipid Panel with Direct LDL reflex; Future    Other hyperlipidemia  2024 labs reviewed. Stable. Continue rosuvastatin 10 mg. Refill today. The patient will obtain the lab work ordered today. The patient will be notified of results when available and also any further recommendations. Continue low-fat, heart healthy dietary changes involving lean meats, whole grains, fruits, veggies, and healthy fats, and avoidance of red meats, fast foods, fatty and fried foods. Continue increased physical activity and weight loss efforts.    Lab Results   Component Value Date    CHOLESTEROL 154 2024    CHOLESTEROL 138 2023     Lab Results   Component Value Date    HDL 68 2024    HDL 62 2023     Lab Results   Component Value Date    TRIG 82 2024    TRIG 53 2023     Lab Results   Component Value Date    NONHDLC 86 2024     Lab Results   Component Value Date    LDLCALC 70 2024     Orders:  •  rosuvastatin (CRESTOR) 10 MG tablet; Take 1 tablet (10 mg total) by mouth daily at  bedtime  •  CBC and differential; Future  •  Comprehensive metabolic panel; Future  •  Lipid Panel with Direct LDL reflex; Future    BMI 39.0-39.9,adult  Continue low-fat, heart healthy dietary changes involving lean meats, whole grains, fruits, veggies, and healthy fats, and avoidance of red meats, fast foods, fatty and fried foods. Continue increased physical activity and weight loss efforts.         Screening for diabetes mellitus    Orders:  •  Hemoglobin A1C; Future    Screening for prostate cancer    Orders:  •  PSA, ultrasensitive; Future    Primary narcolepsy without cataplexy  Declines sleep referral. No current meds. No concerns.       Preventive Screenings:  - Diabetes Screening: risks/benefits discussed and orders placed  - Cholesterol Screening: has hyperlipidemia and orders placed   - Hepatitis C screening: patient declines   - HIV screening: patient declines   - Colon cancer screening: screening up-to-date   - Lung cancer screening: screening not indicated   - Prostate cancer screening: orders placed and risks/benefits discussed     Immunizations:  - Immunizations due: Influenza  - Risks/benefits immunizations discussed    - The patient declines recommended vaccines currently despite my recommendations      Counseling/Anticipatory Guidance:  - Alcohol: discussed moderation in alcohol intake and recommendations for healthy alcohol use.   - Drug use: discussed harms of illicit drug use and how it can negatively impact mental/physical health.   - Tobacco use: discussed harms of tobacco use and management options for quitting.   - Dental health: discussed importance of regular tooth brushing, flossing, and dental visits.   - Sexual health: discussed sexually transmitted diseases, partner selection, use of condoms, avoidance of unintended pregnancy, and contraceptive alternatives.   - Diet: discussed recommendations for a healthy/well-balanced diet.   - Exercise: the importance of regular exercise/physical  activity was discussed. Recommend exercise 3-5 times per week for at least 30 minutes.   - Injury prevention: discussed safety/seat belts, safety helmets, smoke detectors, carbon monoxide detectors, and smoking near bedding or upholstery.       Depression Screening and Follow-up Plan: Patient was screened for depression during today's encounter. They screened negative with a PHQ-9 score of 0.      Follow up in 1 year or sooner if needed.    History of Present Illness   {?Quick Links Encounters * My Last Note * Last Note in Specialty * Snapshot * Since Last Visit * History :31612}  Adult Annual Physical:  Patient presents for annual physical. No concerns. Needs med refills..     Diet and Physical Activity:  - Diet/Nutrition: no special diet. Tries to be gluten free  - Exercise: no formal exercise.    Depression Screening:    - PHQ-9 Score: 0    General Health:  - Sleep: sleeps poorly and 4-6 hours of sleep on average. wakes up and mind goes. Uses sleep gummies.  - Hearing: normal hearing bilateral ears.  - Vision: no vision problems and most recent eye exam < 1 year ago.  - Dental: regular dental visits and brushes teeth twice daily.     Health:  - History of STDs: no.   - Urinary symptoms: none.     Advanced Care Planning:  - Has an advanced directive?: no    - Has a durable medical POA?: no    - ACP document given to patient?: no      Review of Systems   Constitutional: Negative.  Negative for chills, fatigue and fever.   HENT: Negative.  Negative for congestion, ear pain, rhinorrhea and sore throat.    Eyes: Negative.  Negative for pain and visual disturbance.   Respiratory: Negative.  Negative for cough and shortness of breath.    Cardiovascular: Negative.  Negative for chest pain, palpitations and leg swelling.   Gastrointestinal:  Negative for abdominal pain, constipation, diarrhea, nausea and vomiting.   Endocrine: Negative.    Genitourinary: Negative.  Negative for dysuria, frequency and urgency.  "  Musculoskeletal: Negative.  Negative for back pain and myalgias.   Skin: Negative.  Negative for rash.   Allergic/Immunologic: Negative.    Neurological: Negative.  Negative for dizziness, weakness, light-headedness and headaches.   Hematological: Negative.    Psychiatric/Behavioral: Negative.           Objective {?Quick Links Trend Vitals * Enter New Vitals * Results Review * Timeline (Adult) * Labs * Imaging * Cardiology * Procedures * Lung Cancer Screening * Surgical eConsent :61869}  /70 (BP Location: Left arm, Patient Position: Sitting, Cuff Size: Large)   Pulse 73   Temp (!) 96.4 °F (35.8 °C) (Tympanic)   Resp 16   Ht 5' 8\" (1.727 m)   Wt 119 kg (261 lb 6.4 oz)   SpO2 97%   BMI 39.75 kg/m²     Physical Exam  Vitals and nursing note reviewed.   Constitutional:       General: He is not in acute distress.     Appearance: Normal appearance. He is obese. He is not ill-appearing.   HENT:      Head: Normocephalic and atraumatic.      Right Ear: Tympanic membrane, ear canal and external ear normal.      Left Ear: Tympanic membrane, ear canal and external ear normal.      Nose: Nose normal. No congestion.      Mouth/Throat:      Mouth: Mucous membranes are moist.      Pharynx: Oropharynx is clear. No posterior oropharyngeal erythema.   Eyes:      Extraocular Movements: Extraocular movements intact.      Conjunctiva/sclera: Conjunctivae normal.      Pupils: Pupils are equal, round, and reactive to light.   Cardiovascular:      Rate and Rhythm: Normal rate and regular rhythm.      Heart sounds: Normal heart sounds. No murmur heard.  Pulmonary:      Effort: Pulmonary effort is normal. No respiratory distress.      Breath sounds: Normal breath sounds. No wheezing.   Abdominal:      General: Abdomen is flat. Bowel sounds are normal.      Palpations: Abdomen is soft.      Tenderness: There is no abdominal tenderness.   Musculoskeletal:         General: No tenderness. Normal range of motion.      Cervical back: " Normal range of motion and neck supple. No tenderness.   Lymphadenopathy:      Cervical: No cervical adenopathy.   Skin:     General: Skin is warm and dry.      Capillary Refill: Capillary refill takes less than 2 seconds.      Findings: No bruising or rash.   Neurological:      General: No focal deficit present.      Mental Status: He is alert and oriented to person, place, and time.   Psychiatric:         Mood and Affect: Mood normal.         Behavior: Behavior normal.

## 2025-04-02 NOTE — ASSESSMENT & PLAN NOTE
July2024 labs reviewed. Stable. Continue rosuvastatin 10 mg. Refill today. The patient will obtain the lab work ordered today. The patient will be notified of results when available and also any further recommendations. Continue low-fat, heart healthy dietary changes involving lean meats, whole grains, fruits, veggies, and healthy fats, and avoidance of red meats, fast foods, fatty and fried foods. Continue increased physical activity and weight loss efforts.    Lab Results   Component Value Date    CHOLESTEROL 154 07/05/2024    CHOLESTEROL 138 08/28/2023     Lab Results   Component Value Date    HDL 68 07/05/2024    HDL 62 08/28/2023     Lab Results   Component Value Date    TRIG 82 07/05/2024    TRIG 53 08/28/2023     Lab Results   Component Value Date    NONHDLC 86 07/05/2024     Lab Results   Component Value Date    LDLCALC 70 07/05/2024     Orders:  •  rosuvastatin (CRESTOR) 10 MG tablet; Take 1 tablet (10 mg total) by mouth daily at bedtime  •  CBC and differential; Future  •  Comprehensive metabolic panel; Future  •  Lipid Panel with Direct LDL reflex; Future

## 2025-04-04 ENCOUNTER — RESULTS FOLLOW-UP (OUTPATIENT)
Dept: FAMILY MEDICINE CLINIC | Facility: CLINIC | Age: 60
End: 2025-04-04

## 2025-04-04 LAB
ALBUMIN SERPL-MCNC: 4.4 G/DL (ref 3.9–4.9)
ALP SERPL-CCNC: 122 IU/L (ref 44–121)
ALT SERPL-CCNC: 22 IU/L (ref 0–44)
AST SERPL-CCNC: 23 IU/L (ref 0–40)
BASOPHILS # BLD AUTO: 0.1 X10E3/UL (ref 0–0.2)
BASOPHILS NFR BLD AUTO: 1 %
BILIRUB SERPL-MCNC: 0.7 MG/DL (ref 0–1.2)
BUN SERPL-MCNC: 18 MG/DL (ref 8–27)
BUN/CREAT SERPL: 18 (ref 10–24)
CALCIUM SERPL-MCNC: 9.2 MG/DL (ref 8.6–10.2)
CHLORIDE SERPL-SCNC: 103 MMOL/L (ref 96–106)
CHOLEST SERPL-MCNC: 147 MG/DL (ref 100–199)
CO2 SERPL-SCNC: 23 MMOL/L (ref 20–29)
CREAT SERPL-MCNC: 1.01 MG/DL (ref 0.76–1.27)
EGFR: 84 ML/MIN/1.73
EOSINOPHIL # BLD AUTO: 0.2 X10E3/UL (ref 0–0.4)
EOSINOPHIL NFR BLD AUTO: 3 %
ERYTHROCYTE [DISTWIDTH] IN BLOOD BY AUTOMATED COUNT: 11.5 % (ref 11.6–15.4)
EST. AVERAGE GLUCOSE BLD GHB EST-MCNC: 114 MG/DL
GLOBULIN SER-MCNC: 2.6 G/DL (ref 1.5–4.5)
GLUCOSE SERPL-MCNC: 94 MG/DL (ref 70–99)
HBA1C MFR BLD: 5.6 % (ref 4.8–5.6)
HCT VFR BLD AUTO: 47.7 % (ref 37.5–51)
HDLC SERPL-MCNC: 62 MG/DL
HGB BLD-MCNC: 16.1 G/DL (ref 13–17.7)
IMM GRANULOCYTES # BLD: 0.1 X10E3/UL (ref 0–0.1)
IMM GRANULOCYTES NFR BLD: 1 %
LDLC SERPL CALC-MCNC: 72 MG/DL (ref 0–99)
LDLC/HDLC SERPL: 1.2 RATIO (ref 0–3.6)
LYMPHOCYTES # BLD AUTO: 2 X10E3/UL (ref 0.7–3.1)
LYMPHOCYTES NFR BLD AUTO: 30 %
MCH RBC QN AUTO: 31 PG (ref 26.6–33)
MCHC RBC AUTO-ENTMCNC: 33.8 G/DL (ref 31.5–35.7)
MCV RBC AUTO: 92 FL (ref 79–97)
MONOCYTES # BLD AUTO: 0.7 X10E3/UL (ref 0.1–0.9)
MONOCYTES NFR BLD AUTO: 10 %
MORPHOLOGY BLD-IMP: ABNORMAL
NEUTROPHILS # BLD AUTO: 3.7 X10E3/UL (ref 1.4–7)
NEUTROPHILS NFR BLD AUTO: 55 %
PLATELET # BLD AUTO: 222 X10E3/UL (ref 150–450)
POTASSIUM SERPL-SCNC: 4.5 MMOL/L (ref 3.5–5.2)
PROT SERPL-MCNC: 7 G/DL (ref 6–8.5)
PSA SERPL DL<=0.01 NG/ML-MCNC: 0.4 NG/ML (ref 0–4)
RBC # BLD AUTO: 5.2 X10E6/UL (ref 4.14–5.8)
SL AMB VLDL CHOLESTEROL CALC: 13 MG/DL (ref 5–40)
SODIUM SERPL-SCNC: 141 MMOL/L (ref 134–144)
TRIGL SERPL-MCNC: 61 MG/DL (ref 0–149)
WBC # BLD AUTO: 6.8 X10E3/UL (ref 3.4–10.8)

## 2025-05-07 DIAGNOSIS — R19.7 DIARRHEA, UNSPECIFIED TYPE: ICD-10-CM

## 2025-05-07 RX ORDER — BUDESONIDE 3 MG/1
CAPSULE, COATED PELLETS ORAL
Qty: 90 CAPSULE | Refills: 1 | OUTPATIENT
Start: 2025-05-07 | End: 2025-07-03

## 2025-05-07 NOTE — TELEPHONE ENCOUNTER
Patient was seen by Dr. Bruno in September and placed on a taper.  If patient is continuing to have symptoms he should be contacting the office otherwise no need to refill patient's medication.

## 2025-06-11 ENCOUNTER — OFFICE VISIT (OUTPATIENT)
Dept: FAMILY MEDICINE CLINIC | Facility: CLINIC | Age: 60
End: 2025-06-11
Payer: COMMERCIAL

## 2025-06-11 VITALS
SYSTOLIC BLOOD PRESSURE: 122 MMHG | TEMPERATURE: 98.2 F | HEART RATE: 85 BPM | HEIGHT: 68 IN | BODY MASS INDEX: 31.89 KG/M2 | RESPIRATION RATE: 20 BRPM | WEIGHT: 210.4 LBS | DIASTOLIC BLOOD PRESSURE: 80 MMHG | OXYGEN SATURATION: 96 %

## 2025-06-11 DIAGNOSIS — M25.522 LEFT ELBOW PAIN: Primary | ICD-10-CM

## 2025-06-11 DIAGNOSIS — N52.9 ERECTILE DYSFUNCTION, UNSPECIFIED ERECTILE DYSFUNCTION TYPE: ICD-10-CM

## 2025-06-11 PROCEDURE — 99213 OFFICE O/P EST LOW 20 MIN: CPT | Performed by: FAMILY MEDICINE

## 2025-06-11 RX ORDER — TADALAFIL 10 MG/1
10 TABLET ORAL DAILY PRN
Qty: 10 TABLET | Refills: 0 | Status: SHIPPED | OUTPATIENT
Start: 2025-06-11

## 2025-06-11 RX ORDER — PREDNISONE 10 MG/1
TABLET ORAL
Qty: 21 TABLET | Refills: 0 | Status: SHIPPED | OUTPATIENT
Start: 2025-06-11

## 2025-06-28 DIAGNOSIS — N52.9 ERECTILE DYSFUNCTION, UNSPECIFIED ERECTILE DYSFUNCTION TYPE: ICD-10-CM

## 2025-06-30 RX ORDER — TADALAFIL 10 MG/1
TABLET ORAL
Qty: 8 TABLET | Refills: 1 | Status: SHIPPED | OUTPATIENT
Start: 2025-06-30

## 2025-08-16 DIAGNOSIS — N52.9 ERECTILE DYSFUNCTION, UNSPECIFIED ERECTILE DYSFUNCTION TYPE: ICD-10-CM

## 2025-08-18 RX ORDER — TADALAFIL 10 MG/1
TABLET ORAL
Qty: 8 TABLET | Refills: 4 | Status: SHIPPED | OUTPATIENT
Start: 2025-08-18

## 2025-08-19 DIAGNOSIS — K21.9 GASTROESOPHAGEAL REFLUX DISEASE WITHOUT ESOPHAGITIS: ICD-10-CM

## 2025-08-20 RX ORDER — OMEPRAZOLE 40 MG/1
40 CAPSULE, DELAYED RELEASE ORAL DAILY
Qty: 90 CAPSULE | Refills: 3 | Status: SHIPPED | OUTPATIENT
Start: 2025-08-20